# Patient Record
Sex: FEMALE | Race: WHITE | NOT HISPANIC OR LATINO | Employment: FULL TIME | URBAN - METROPOLITAN AREA
[De-identification: names, ages, dates, MRNs, and addresses within clinical notes are randomized per-mention and may not be internally consistent; named-entity substitution may affect disease eponyms.]

---

## 2019-04-12 ENCOUNTER — TRANSCRIBE ORDERS (OUTPATIENT)
Dept: LAB | Facility: HOSPITAL | Age: 29
End: 2019-04-12

## 2019-04-12 ENCOUNTER — LAB (OUTPATIENT)
Dept: LAB | Facility: HOSPITAL | Age: 29
End: 2019-04-12

## 2019-04-12 ENCOUNTER — APPOINTMENT (OUTPATIENT)
Dept: LAB | Facility: HOSPITAL | Age: 29
End: 2019-04-12

## 2019-04-12 DIAGNOSIS — Z32.01 PREGNANCY EXAMINATION OR TEST, POSITIVE RESULT: ICD-10-CM

## 2019-04-12 DIAGNOSIS — Z32.01 PREGNANCY EXAMINATION OR TEST, POSITIVE RESULT: Primary | ICD-10-CM

## 2019-04-12 LAB — HCG INTACT+B SERPL-ACNC: 4180 MIU/ML

## 2019-04-12 PROCEDURE — 36415 COLL VENOUS BLD VENIPUNCTURE: CPT

## 2019-04-12 PROCEDURE — 84702 CHORIONIC GONADOTROPIN TEST: CPT

## 2019-05-07 ENCOUNTER — LAB (OUTPATIENT)
Dept: LAB | Facility: HOSPITAL | Age: 29
End: 2019-05-07

## 2019-05-07 ENCOUNTER — TRANSCRIBE ORDERS (OUTPATIENT)
Dept: LAB | Facility: HOSPITAL | Age: 29
End: 2019-05-07

## 2019-05-07 DIAGNOSIS — Z34.81 PRENATAL CARE, SUBSEQUENT PREGNANCY, FIRST TRIMESTER: ICD-10-CM

## 2019-05-07 DIAGNOSIS — Z3A.08 8 WEEKS GESTATION OF PREGNANCY: Primary | ICD-10-CM

## 2019-05-07 DIAGNOSIS — Z3A.08 8 WEEKS GESTATION OF PREGNANCY: ICD-10-CM

## 2019-05-07 LAB
ABO GROUP BLD: NORMAL
BASOPHILS # BLD AUTO: 0.03 10*3/MM3 (ref 0–0.2)
BASOPHILS NFR BLD AUTO: 0.3 % (ref 0–1.5)
BLD GP AB SCN SERPL QL: NEGATIVE
DEPRECATED RDW RBC AUTO: 40.3 FL (ref 37–54)
EOSINOPHIL # BLD AUTO: 0.05 10*3/MM3 (ref 0–0.4)
EOSINOPHIL NFR BLD AUTO: 0.5 % (ref 0.3–6.2)
ERYTHROCYTE [DISTWIDTH] IN BLOOD BY AUTOMATED COUNT: 12.7 % (ref 12.3–15.4)
GLUCOSE BLD-MCNC: 93 MG/DL (ref 65–99)
HBV SURFACE AG SERPL QL IA: NORMAL
HCT VFR BLD AUTO: 41.3 % (ref 34–46.6)
HCV AB SER DONR QL: NORMAL
HGB BLD-MCNC: 13.1 G/DL (ref 12–15.9)
HIV1+2 AB SER QL: NORMAL
IMM GRANULOCYTES # BLD AUTO: 0.05 10*3/MM3 (ref 0–0.05)
IMM GRANULOCYTES NFR BLD AUTO: 0.5 % (ref 0–0.5)
LYMPHOCYTES # BLD AUTO: 1.67 10*3/MM3 (ref 0.7–3.1)
LYMPHOCYTES NFR BLD AUTO: 16.3 % (ref 19.6–45.3)
MCH RBC QN AUTO: 27.8 PG (ref 26.6–33)
MCHC RBC AUTO-ENTMCNC: 31.7 G/DL (ref 31.5–35.7)
MCV RBC AUTO: 87.5 FL (ref 79–97)
MONOCYTES # BLD AUTO: 0.74 10*3/MM3 (ref 0.1–0.9)
MONOCYTES NFR BLD AUTO: 7.2 % (ref 5–12)
NEUTROPHILS # BLD AUTO: 7.7 10*3/MM3 (ref 1.7–7)
NEUTROPHILS NFR BLD AUTO: 75.2 % (ref 42.7–76)
NRBC BLD AUTO-RTO: 0 /100 WBC (ref 0–0.2)
PLATELET # BLD AUTO: 240 10*3/MM3 (ref 140–450)
PMV BLD AUTO: 12 FL (ref 6–12)
RBC # BLD AUTO: 4.72 10*6/MM3 (ref 3.77–5.28)
RH BLD: NEGATIVE
RPR SER QL: NORMAL
RUBV IGG SERPL IA-ACNC: POSITIVE
WBC NRBC COR # BLD: 10.24 10*3/MM3 (ref 3.4–10.8)

## 2019-05-07 PROCEDURE — 36415 COLL VENOUS BLD VENIPUNCTURE: CPT

## 2019-05-07 PROCEDURE — 82947 ASSAY GLUCOSE BLOOD QUANT: CPT

## 2019-05-07 PROCEDURE — 80081 OBSTETRIC PANEL INC HIV TSTG: CPT

## 2019-05-07 PROCEDURE — 86803 HEPATITIS C AB TEST: CPT

## 2019-07-30 ENCOUNTER — LAB REQUISITION (OUTPATIENT)
Dept: LAB | Facility: HOSPITAL | Age: 29
End: 2019-07-30

## 2019-07-30 DIAGNOSIS — Z00.00 ROUTINE GENERAL MEDICAL EXAMINATION AT A HEALTH CARE FACILITY: ICD-10-CM

## 2019-07-30 PROCEDURE — 36415 COLL VENOUS BLD VENIPUNCTURE: CPT

## 2019-09-24 ENCOUNTER — APPOINTMENT (OUTPATIENT)
Dept: LAB | Facility: HOSPITAL | Age: 29
End: 2019-09-24

## 2019-09-24 ENCOUNTER — TRANSCRIBE ORDERS (OUTPATIENT)
Dept: LAB | Facility: HOSPITAL | Age: 29
End: 2019-09-24

## 2019-09-24 DIAGNOSIS — Z3A.28 28 WEEKS GESTATION OF PREGNANCY: ICD-10-CM

## 2019-09-24 DIAGNOSIS — Z34.83 PRENATAL CARE, SUBSEQUENT PREGNANCY, THIRD TRIMESTER: Primary | ICD-10-CM

## 2019-09-24 LAB
BLD GP AB SCN SERPL QL: NEGATIVE
DEPRECATED RDW RBC AUTO: 38.5 FL (ref 37–54)
ERYTHROCYTE [DISTWIDTH] IN BLOOD BY AUTOMATED COUNT: 12.4 % (ref 12.3–15.4)
GLUCOSE 1H P 100 G GLC PO SERPL-MCNC: 127 MG/DL (ref 65–140)
HCT VFR BLD AUTO: 35.2 % (ref 34–46.6)
HGB BLD-MCNC: 11.5 G/DL (ref 12–15.9)
MCH RBC QN AUTO: 27.8 PG (ref 26.6–33)
MCHC RBC AUTO-ENTMCNC: 32.7 G/DL (ref 31.5–35.7)
MCV RBC AUTO: 85 FL (ref 79–97)
PLATELET # BLD AUTO: 242 10*3/MM3 (ref 140–450)
PMV BLD AUTO: 10.9 FL (ref 6–12)
RBC # BLD AUTO: 4.14 10*6/MM3 (ref 3.77–5.28)
WBC NRBC COR # BLD: 13.52 10*3/MM3 (ref 3.4–10.8)

## 2019-09-24 PROCEDURE — 85027 COMPLETE CBC AUTOMATED: CPT | Performed by: OBSTETRICS & GYNECOLOGY

## 2019-09-24 PROCEDURE — 36415 COLL VENOUS BLD VENIPUNCTURE: CPT | Performed by: OBSTETRICS & GYNECOLOGY

## 2019-09-24 PROCEDURE — 86850 RBC ANTIBODY SCREEN: CPT | Performed by: OBSTETRICS & GYNECOLOGY

## 2019-09-24 PROCEDURE — 82950 GLUCOSE TEST: CPT | Performed by: OBSTETRICS & GYNECOLOGY

## 2019-12-03 ENCOUNTER — HOSPITAL ENCOUNTER (OUTPATIENT)
Facility: HOSPITAL | Age: 29
Discharge: HOME | End: 2019-12-03
Attending: OBSTETRICS & GYNECOLOGY | Admitting: ADVANCED PRACTICE MIDWIFE

## 2019-12-03 VITALS
BODY MASS INDEX: 48.4 KG/M2 | HEIGHT: 62 IN | DIASTOLIC BLOOD PRESSURE: 72 MMHG | TEMPERATURE: 98.9 F | SYSTOLIC BLOOD PRESSURE: 118 MMHG | WEIGHT: 263 LBS | RESPIRATION RATE: 18 BRPM | HEART RATE: 78 BPM

## 2019-12-03 PROBLEM — Z3A.38 38 WEEKS GESTATION OF PREGNANCY: Status: ACTIVE | Noted: 2019-12-03

## 2019-12-03 LAB
ALP SERPL-CCNC: 142 U/L (ref 39–117)
ALT SERPL W P-5'-P-CCNC: 10 U/L (ref 1–33)
AST SERPL-CCNC: 12 U/L (ref 1–32)
BILIRUB SERPL-MCNC: <0.2 MG/DL (ref 0.2–1.2)
CREAT BLD-MCNC: 0.62 MG/DL (ref 0.57–1)
DEPRECATED RDW RBC AUTO: 40.6 FL (ref 37–54)
ERYTHROCYTE [DISTWIDTH] IN BLOOD BY AUTOMATED COUNT: 13.4 % (ref 12.3–15.4)
HCT VFR BLD AUTO: 35.1 % (ref 34–46.6)
HGB BLD-MCNC: 11.1 G/DL (ref 12–15.9)
LDH SERPL-CCNC: 144 U/L (ref 135–214)
MCH RBC QN AUTO: 26.4 PG (ref 26.6–33)
MCHC RBC AUTO-ENTMCNC: 31.6 G/DL (ref 31.5–35.7)
MCV RBC AUTO: 83.6 FL (ref 79–97)
PLATELET # BLD AUTO: 228 10*3/MM3 (ref 140–450)
PMV BLD AUTO: 11.4 FL (ref 6–12)
RBC # BLD AUTO: 4.2 10*6/MM3 (ref 3.77–5.28)
URATE SERPL-MCNC: 4 MG/DL (ref 2.4–5.7)
WBC NRBC COR # BLD: 16.45 10*3/MM3 (ref 3.4–10.8)

## 2019-12-03 PROCEDURE — 82565 ASSAY OF CREATININE: CPT | Performed by: ADVANCED PRACTICE MIDWIFE

## 2019-12-03 PROCEDURE — 85027 COMPLETE CBC AUTOMATED: CPT | Performed by: ADVANCED PRACTICE MIDWIFE

## 2019-12-03 PROCEDURE — 84460 ALANINE AMINO (ALT) (SGPT): CPT | Performed by: ADVANCED PRACTICE MIDWIFE

## 2019-12-03 PROCEDURE — G0463 HOSPITAL OUTPT CLINIC VISIT: HCPCS

## 2019-12-03 PROCEDURE — 59025 FETAL NON-STRESS TEST: CPT

## 2019-12-03 PROCEDURE — 84450 TRANSFERASE (AST) (SGOT): CPT | Performed by: ADVANCED PRACTICE MIDWIFE

## 2019-12-03 PROCEDURE — 83615 LACTATE (LD) (LDH) ENZYME: CPT | Performed by: ADVANCED PRACTICE MIDWIFE

## 2019-12-03 PROCEDURE — 82247 BILIRUBIN TOTAL: CPT | Performed by: ADVANCED PRACTICE MIDWIFE

## 2019-12-03 PROCEDURE — 84550 ASSAY OF BLOOD/URIC ACID: CPT | Performed by: ADVANCED PRACTICE MIDWIFE

## 2019-12-03 PROCEDURE — 84075 ASSAY ALKALINE PHOSPHATASE: CPT | Performed by: ADVANCED PRACTICE MIDWIFE

## 2019-12-03 PROCEDURE — 36415 COLL VENOUS BLD VENIPUNCTURE: CPT | Performed by: ADVANCED PRACTICE MIDWIFE

## 2019-12-03 RX ORDER — PRENATAL WITH FERROUS FUM AND FOLIC ACID 3080; 920; 120; 400; 22; 1.84; 3; 20; 10; 1; 12; 200; 27; 25; 2 [IU]/1; [IU]/1; MG/1; [IU]/1; MG/1; MG/1; MG/1; MG/1; MG/1; MG/1; UG/1; MG/1; MG/1; MG/1; MG/1
1 TABLET ORAL DAILY
COMMUNITY
End: 2019-12-14 | Stop reason: HOSPADM

## 2019-12-04 ENCOUNTER — HOSPITAL ENCOUNTER (OUTPATIENT)
Facility: HOSPITAL | Age: 29
Discharge: HOME | End: 2019-12-04
Attending: OBSTETRICS & GYNECOLOGY | Admitting: OBSTETRICS & GYNECOLOGY

## 2019-12-04 VITALS
DIASTOLIC BLOOD PRESSURE: 69 MMHG | SYSTOLIC BLOOD PRESSURE: 128 MMHG | RESPIRATION RATE: 16 BRPM | TEMPERATURE: 97.9 F | HEART RATE: 70 BPM

## 2019-12-04 PROCEDURE — G0463 HOSPITAL OUTPT CLINIC VISIT: HCPCS

## 2019-12-04 PROCEDURE — 99203 OFFICE O/P NEW LOW 30 MIN: CPT | Performed by: OBSTETRICS & GYNECOLOGY

## 2019-12-04 PROCEDURE — 59025 FETAL NON-STRESS TEST: CPT

## 2019-12-04 RX ORDER — MULTIVITAMIN WITH IRON
TABLET ORAL
COMMUNITY
End: 2019-12-14 | Stop reason: HOSPADM

## 2019-12-04 NOTE — H&P
[facility]  Obstetric History and Physical    Chief Complaint   Patient presents with   • Elevated Blood Pressure       Subjective     Patient is a 28 y.o. female  currently at 38w2d, who presents with complaints of headache and elevated blood pressure.  She was seen last evening for similar complaints and noted to be normotensive on evaluation on labor and delivery.  Laboratory evaluation showed no evidence of HELLP.  She states she was at work today and had her blood pressure repeated.  Again she reported elevated values and presents for evaluation.  On presentation, q. 10-minute vitals of been obtained all with normal pressures.    Her prenatal care is notable for recent onset of transient gestational hypertension.. Her previous obstetric/gynecological history is noncontributory.    The following portions of the patients history were reviewed and updated as appropriate: current medications, allergies, past medical history, past surgical history, past family history, past social history and problem list .        Prenatal Information:   Maternal Prenatal Labs  Blood Type No results found for: ABO   Rh Status No results found for: RH   Antibody Screen No results found for: ABSCRN   Gonnorhea No results found for: GCCX   Chlamydia No results found for: CLAMYDCU   RPR No results found for: RPR   Syphilis Antibody No results found for: SYPHILIS   Rubella No results found for: RUBELLAIGGIN   Hepatitis B Surface Antigen No results found for: HEPBSAG   HIV-1 Antibody No results found for: LABHIV1   Hepatitis C Antibody No results found for: HEPCAB   Rapid Urin Drug Screen No results found for: AMPMETHU, BARBITSCNUR, LABBENZSCN, LABMETHSCN, LABOPIASCN, THCURSCR, COCAINEUR, AMPHETSCREEN, PROPOXSCN, BUPRENORSCNU, METAMPSCNUR, OXYCODONESCN, TRICYCLICSCN   Group B Strep Culture No results found for: GBSANTIGEN           External Prenatal Results     Pregnancy Outside Results - Transcribed From Office Records - See  Scanned Records For Details     Test Value Date Time    Hgb 11.1 g/dL 19 182    Hct 35.1 % 19 182    ABO AB  19    Rh Negative  19    Antibody Screen Negative  19 0958    Glucose Fasting GTT       Glucose Tolerance Test 1 hour       Glucose Tolerance Test 3 hour       Gonorrhea (discrete)       Chlamydia (discrete)       RPR Non-Reactive  19    VDRL       Syphilis Antibody       Rubella Positive  19    HBsAg Non-Reactive  19    Herpes Simplex Virus PCR       Herpes Simplex VIrus Culture       HIV Non-Reactive  19    Hep C RNA Quant PCR       Hep C Antibody Non-Reactive  19    AFP       Group B Strep       GBS Susceptibility to Clindamycin       GBS Susceptibility to Erythromycin       Fetal Fibronectin       Genetic Testing, Maternal Blood             Drug Screening     Test Value Date Time    Urine Drug Screen       Amphetamine Screen       Barbiturate Screen       Benzodiazepine Screen       Methadone Screen       Phencyclidine Screen       Opiates Screen       THC Screen       Cocaine Screen       Propoxyphene Screen       Buprenorphine Screen       Methamphetamine Screen       Oxycodone Screen       Tricyclic Antidepressants Screen                     Past OB History:       Obstetric History       T0      L0     SAB0   TAB0   Ectopic0   Molar0   Multiple0   Live Births0       # Outcome Date GA Lbr Binu/2nd Weight Sex Delivery Anes PTL Lv   1 Current                   Past Medical History:  Past Medical History:   Diagnosis Date   • Gestational hypertension    • Migraine    • Osteoarthritis     right foot   • Urinary tract infection     frequent as a child      Past Surgical History Past Surgical History:   Procedure Laterality Date   • MOUTH SURGERY     • TONSILLECTOMY     • WISDOM TOOTH EXTRACTION        Family History: Family History   Problem Relation Age of Onset   • No Known Problems Father    • No  Known Problems Mother    • No Known Problems Brother    • No Known Problems Sister    • No Known Problems Son    • No Known Problems Daughter    • No Known Problems Paternal Grandfather    • No Known Problems Paternal Grandmother    • No Known Problems Maternal Grandmother    • No Known Problems Maternal Grandfather    • No Known Problems Maternal Aunt    • No Known Problems Maternal Uncle    • No Known Problems Paternal Aunt    • No Known Problems Paternal Uncle    • No Known Problems Other       Social History:  reports that she has never smoked. She has never used smokeless tobacco.   reports that she does not drink alcohol.   reports that she does not use drugs.   Allergies: Patient has no known allergies.  Current Medications:          No current facility-administered medications on file prior to encounter.      Current Outpatient Medications on File Prior to Encounter   Medication Sig Dispense Refill   • Magnesium 250 MG tablet Take  by mouth.     • Prenatal Vit-Fe Fumarate-FA (PRENATAL 27-1) 27-1 MG tablet tablet Take 1 tablet by mouth Daily.         General ROS: Pertinent items are noted in HPI, all other systems reviewed and negative    Objective       Vital Signs Range for the last 24 hours  Temperature: Temp:  [97.9 °F (36.6 °C)-98.9 °F (37.2 °C)] 97.9 °F (36.6 °C)   Temp Source: Temp src: Oral   BP: BP: (118-128)/(59-72) 128/69   Pulse: Heart Rate:  [69-78] 70   Respirations: Resp:  [16-18] 16   SPO2:     O2 Amount (l/min):     O2 Devices     Weight: Weight:  [119 kg (263 lb)] 119 kg (263 lb)     Physical Examination: General appearance - alert, well appearing, and in no distress, oriented to person, place, and time and overweight  Mental status - alert, oriented to person, place, and time, anxious  Eyes - pupils equal and reactive, extraocular eye movements intact, sclera anicteric  Mouth - mucous membranes moist, pharynx normal without lesions and dental hygiene good  Neck - supple, no significant  adenopathy, thyroid exam: thyroid is normal in size without nodules or tenderness  Chest - clear to auscultation, no wheezes, rales or rhonchi, symmetric air entry  Heart - normal rate, regular rhythm, normal S1, S2, no murmurs, rubs, clicks or gallops  Abdomen-soft, nontender, nondistended, no masses or organomegaly   Abdomen, Non-Tender  Neurological - alert, oriented, normal speech, no focal findings or movement disorder noted, DTR's normal and symmetric  Extremities - pedal edema 1 +    Fetal Heart Rate Assessment  Indication: Transient hypertension   Start Time: 1335                end Time: 1449   NST Results: NST reactive.  Baseline fetal heart rate 125-130 bpm.  Average variability with multiple 15 x 15 accelerations.  No decelerations.  No contractions.        Laboratory Results:   Lab Results   Component Value Date    ALKPHOS 142 (H) 2019    ALT 10 2019    AST 12 2019    CREATININE 0.62 2019    BILITOT <0.2 (L) 2019     2019    URICACID 4.0 2019       WBC   Date Value Ref Range Status   2019 16.45 (H) 3.40 - 10.80 10*3/mm3 Final     RBC   Date Value Ref Range Status   2019 4.20 3.77 - 5.28 10*6/mm3 Final     Hemoglobin   Date Value Ref Range Status   2019 11.1 (L) 12.0 - 15.9 g/dL Final     Hematocrit   Date Value Ref Range Status   2019 35.1 34.0 - 46.6 % Final     MCV   Date Value Ref Range Status   2019 83.6 79.0 - 97.0 fL Final     MCH   Date Value Ref Range Status   2019 26.4 (L) 26.6 - 33.0 pg Final     MCHC   Date Value Ref Range Status   2019 31.6 31.5 - 35.7 g/dL Final     RDW   Date Value Ref Range Status   2019 13.4 12.3 - 15.4 % Final     RDW-SD   Date Value Ref Range Status   2019 40.6 37.0 - 54.0 fl Final     MPV   Date Value Ref Range Status   2019 11.4 6.0 - 12.0 fL Final     Platelets   Date Value Ref Range Status   2019 228 140 - 450 10*3/mm3 Final             Brief  "Urine Lab Results     None            Radiology Review: No new studies  Other Studies: See CBC and PEP from last evening    Assessment/Plan       * No active hospital problems. *        Assessment:  1. Transient gestational hypertension.  Normotensive on evaluation today    Plan:  1. Discharge to home.  2. Instructed regarding use of appropriate BP cuff.  3. Signs/symptoms of severe preeclampsia reviewed.  4. Keep appointment for induction of labor scheduled for 12/10.     Total time spent today with Valentine  was 20 minutes (level 3).  Of this time, > 50% was spent face-to-face time coordinating care, answering her questions and counseling regarding pathophysiology of her presenting problem along with plans for any diagnostic work-up and treatment.        Tristin Maurice \"Cheyanne\" PRADEEP Forte MD  12/4/2019  3:02 PM    "

## 2019-12-04 NOTE — DISCHARGE SUMMARY
[facility]  Observation and Discharge summary      Patient: Valentine Gonzales      MR#:[id number]  Admission  Diagnosis:   Patient Active Problem List   Diagnosis   • 38 weeks gestation of pregnancy     Discharge Diagnosis: 38 weeks gestation of pregnany      Date of Admission: 12/3/2019  Date of Discharge:  12/3/2019      Service:  Obstetrics    Hospital Course:  Patient presented from office with borderline blood pressures of 140/80s and HA last evening.  She has had normal blood pressures to this point. Blood pressures here under observation are 118-127/59-72. She states HA has resolved spontaneously. She denies visual disturbances and epigastric pain. Reflexes 2+, no clonus. Labs WNL. She voices good fetal movement. She denies vaginal bleeding, leaking of fluid, and contractions. She is scheduled for IOL next week. During hospitaliztiton, she remained afebrile and hemodynamically stable.  On the day of discharge, she was eating, ambulating and voiding without difficulty.      Labs:    Lab Results   Component Value Date    WBC 16.45 (H) 12/03/2019    HGB 11.1 (L) 12/03/2019    HCT 35.1 12/03/2019    MCV 83.6 12/03/2019     12/03/2019    URICACID 4.0 12/03/2019    AST 12 12/03/2019    ALT 10 12/03/2019     12/03/2019           Discharge Medications     Discharge Medications      ASK your doctor about these medications      Instructions Start Date   Prenatal 27-1 27-1 MG tablet tablet   1 tablet, Oral, Daily             Discharge Disposition:  To Home    Discharge Condition:  Stable    Discharge Diet: Regular    Activity at Discharge: Ad tej. S/S of preeclampsia reviewed.     Follow-up Appointments  Follow up with [facility] as scheduled.     Delfino Mendez CNM  12/03/19  10:26 PM

## 2019-12-04 NOTE — NURSING NOTE
Patient discharged ambulatory with spouse home @1943. Discharge instructions reviewed and written materials given to patient. Patient questions answered, and no other patient needs identified.

## 2019-12-10 ENCOUNTER — HOSPITAL ENCOUNTER (OUTPATIENT)
Dept: LABOR AND DELIVERY | Facility: HOSPITAL | Age: 29
Setting detail: SURGERY ADMIT
Discharge: HOME | End: 2019-12-10

## 2019-12-10 ENCOUNTER — HOSPITAL ENCOUNTER (INPATIENT)
Facility: HOSPITAL | Age: 29
LOS: 4 days | Discharge: HOME | End: 2019-12-14
Attending: OBSTETRICS & GYNECOLOGY | Admitting: OBSTETRICS & GYNECOLOGY

## 2019-12-10 PROBLEM — Z34.90 TERM PREGNANCY: Status: ACTIVE | Noted: 2019-12-10

## 2019-12-10 LAB
ABO GROUP BLD: NORMAL
ALP SERPL-CCNC: 151 U/L (ref 39–117)
ALT SERPL W P-5'-P-CCNC: 5 U/L (ref 1–33)
AST SERPL-CCNC: 17 U/L (ref 1–32)
BILIRUB SERPL-MCNC: <0.2 MG/DL (ref 0.2–1.2)
BLD GP AB SCN SERPL QL: NEGATIVE
CREAT BLD-MCNC: 0.63 MG/DL (ref 0.57–1)
DEPRECATED RDW RBC AUTO: 41.5 FL (ref 37–54)
ERYTHROCYTE [DISTWIDTH] IN BLOOD BY AUTOMATED COUNT: 13.5 % (ref 12.3–15.4)
HCT VFR BLD AUTO: 33.2 % (ref 34–46.6)
HGB BLD-MCNC: 10.8 G/DL (ref 12–15.9)
LDH SERPL-CCNC: 241 U/L (ref 135–214)
MCH RBC QN AUTO: 27.3 PG (ref 26.6–33)
MCHC RBC AUTO-ENTMCNC: 32.5 G/DL (ref 31.5–35.7)
MCV RBC AUTO: 83.8 FL (ref 79–97)
PLATELET # BLD AUTO: 215 10*3/MM3 (ref 140–450)
PMV BLD AUTO: 11.5 FL (ref 6–12)
RBC # BLD AUTO: 3.96 10*6/MM3 (ref 3.77–5.28)
RH BLD: NEGATIVE
T&S EXPIRATION DATE: NORMAL
URATE SERPL-MCNC: 4.2 MG/DL (ref 2.4–5.7)
WBC NRBC COR # BLD: 13.63 10*3/MM3 (ref 3.4–10.8)

## 2019-12-10 PROCEDURE — 82565 ASSAY OF CREATININE: CPT | Performed by: OBSTETRICS & GYNECOLOGY

## 2019-12-10 PROCEDURE — 84460 ALANINE AMINO (ALT) (SGPT): CPT | Performed by: OBSTETRICS & GYNECOLOGY

## 2019-12-10 PROCEDURE — 86900 BLOOD TYPING SEROLOGIC ABO: CPT | Performed by: OBSTETRICS & GYNECOLOGY

## 2019-12-10 PROCEDURE — 84075 ASSAY ALKALINE PHOSPHATASE: CPT | Performed by: OBSTETRICS & GYNECOLOGY

## 2019-12-10 PROCEDURE — 59200 INSERT CERVICAL DILATOR: CPT | Performed by: OBSTETRICS & GYNECOLOGY

## 2019-12-10 PROCEDURE — 85027 COMPLETE CBC AUTOMATED: CPT | Performed by: OBSTETRICS & GYNECOLOGY

## 2019-12-10 PROCEDURE — 86901 BLOOD TYPING SEROLOGIC RH(D): CPT | Performed by: OBSTETRICS & GYNECOLOGY

## 2019-12-10 PROCEDURE — 83615 LACTATE (LD) (LDH) ENZYME: CPT | Performed by: OBSTETRICS & GYNECOLOGY

## 2019-12-10 PROCEDURE — 86850 RBC ANTIBODY SCREEN: CPT | Performed by: OBSTETRICS & GYNECOLOGY

## 2019-12-10 PROCEDURE — 84550 ASSAY OF BLOOD/URIC ACID: CPT | Performed by: OBSTETRICS & GYNECOLOGY

## 2019-12-10 PROCEDURE — 82247 BILIRUBIN TOTAL: CPT | Performed by: OBSTETRICS & GYNECOLOGY

## 2019-12-10 PROCEDURE — 84450 TRANSFERASE (AST) (SGOT): CPT | Performed by: OBSTETRICS & GYNECOLOGY

## 2019-12-10 RX ORDER — SODIUM CHLORIDE, SODIUM LACTATE, POTASSIUM CHLORIDE, CALCIUM CHLORIDE 600; 310; 30; 20 MG/100ML; MG/100ML; MG/100ML; MG/100ML
125 INJECTION, SOLUTION INTRAVENOUS CONTINUOUS
Status: DISCONTINUED | OUTPATIENT
Start: 2019-12-10 | End: 2019-12-12

## 2019-12-10 RX ORDER — BUTORPHANOL TARTRATE 1 MG/ML
1 INJECTION, SOLUTION INTRAMUSCULAR; INTRAVENOUS
Status: DISCONTINUED | OUTPATIENT
Start: 2019-12-10 | End: 2019-12-12

## 2019-12-10 RX ORDER — MAGNESIUM CARB/ALUMINUM HYDROX 105-160MG
30 TABLET,CHEWABLE ORAL ONCE
Status: DISCONTINUED | OUTPATIENT
Start: 2019-12-10 | End: 2019-12-12

## 2019-12-10 RX ORDER — ONDANSETRON 2 MG/ML
4 INJECTION INTRAMUSCULAR; INTRAVENOUS EVERY 6 HOURS PRN
Status: DISCONTINUED | OUTPATIENT
Start: 2019-12-10 | End: 2019-12-12

## 2019-12-10 RX ORDER — LIDOCAINE HYDROCHLORIDE 10 MG/ML
5 INJECTION, SOLUTION EPIDURAL; INFILTRATION; INTRACAUDAL; PERINEURAL AS NEEDED
Status: DISCONTINUED | OUTPATIENT
Start: 2019-12-10 | End: 2019-12-12

## 2019-12-10 RX ORDER — OXYTOCIN-SODIUM CHLORIDE 0.9% IV SOLN 30 UNIT/500ML 30-0.9/5 UT/ML-%
2-24 SOLUTION INTRAVENOUS
Status: DISCONTINUED | OUTPATIENT
Start: 2019-12-11 | End: 2019-12-12

## 2019-12-10 RX ORDER — ACETAMINOPHEN 325 MG/1
650 TABLET ORAL EVERY 4 HOURS PRN
Status: DISCONTINUED | OUTPATIENT
Start: 2019-12-10 | End: 2019-12-12

## 2019-12-10 RX ORDER — TERBUTALINE SULFATE 1 MG/ML
0.25 INJECTION, SOLUTION SUBCUTANEOUS AS NEEDED
Status: DISCONTINUED | OUTPATIENT
Start: 2019-12-10 | End: 2019-12-12

## 2019-12-10 RX ORDER — SODIUM CHLORIDE 0.9 % (FLUSH) 0.9 %
3 SYRINGE (ML) INJECTION EVERY 12 HOURS SCHEDULED
Status: DISCONTINUED | OUTPATIENT
Start: 2019-12-10 | End: 2019-12-12

## 2019-12-10 RX ORDER — ONDANSETRON 4 MG/1
4 TABLET, FILM COATED ORAL EVERY 6 HOURS PRN
Status: DISCONTINUED | OUTPATIENT
Start: 2019-12-10 | End: 2019-12-12

## 2019-12-10 RX ORDER — SODIUM CHLORIDE 0.9 % (FLUSH) 0.9 %
1-10 SYRINGE (ML) INJECTION AS NEEDED
Status: DISCONTINUED | OUTPATIENT
Start: 2019-12-10 | End: 2019-12-12

## 2019-12-10 RX ADMIN — SODIUM CHLORIDE, POTASSIUM CHLORIDE, SODIUM LACTATE AND CALCIUM CHLORIDE 125 ML/HR: 600; 310; 30; 20 INJECTION, SOLUTION INTRAVENOUS at 22:05

## 2019-12-11 ENCOUNTER — ANESTHESIA EVENT (OUTPATIENT)
Dept: LABOR AND DELIVERY | Facility: HOSPITAL | Age: 29
End: 2019-12-11

## 2019-12-11 ENCOUNTER — ANESTHESIA (OUTPATIENT)
Dept: LABOR AND DELIVERY | Facility: HOSPITAL | Age: 29
End: 2019-12-11

## 2019-12-11 PROBLEM — Z34.90 TERM PREGNANCY: Status: RESOLVED | Noted: 2019-12-10 | Resolved: 2019-12-11

## 2019-12-11 PROBLEM — Z98.891 S/P CESAREAN SECTION: Status: ACTIVE | Noted: 2019-12-11

## 2019-12-11 PROCEDURE — 51702 INSERT TEMP BLADDER CATH: CPT

## 2019-12-11 PROCEDURE — 25010000002 ROPIVACAINE PER 1 MG: Performed by: NURSE ANESTHETIST, CERTIFIED REGISTERED

## 2019-12-11 PROCEDURE — C1755 CATHETER, INTRASPINAL: HCPCS | Performed by: ANESTHESIOLOGY

## 2019-12-11 PROCEDURE — 25010000002 METOCLOPRAMIDE PER 10 MG: Performed by: NURSE ANESTHETIST, CERTIFIED REGISTERED

## 2019-12-11 PROCEDURE — 25010000002 FENTANYL CITRATE (PF) 100 MCG/2ML SOLUTION: Performed by: NURSE ANESTHETIST, CERTIFIED REGISTERED

## 2019-12-11 PROCEDURE — C1755 CATHETER, INTRASPINAL: HCPCS

## 2019-12-11 PROCEDURE — 25010000002 CEFAZOLIN PER 500 MG: Performed by: OBSTETRICS & GYNECOLOGY

## 2019-12-11 PROCEDURE — 25010000003 MORPHINE PER 10 MG: Performed by: ANESTHESIOLOGY

## 2019-12-11 PROCEDURE — 10907ZC DRAINAGE OF AMNIOTIC FLUID, THERAPEUTIC FROM PRODUCTS OF CONCEPTION, VIA NATURAL OR ARTIFICIAL OPENING: ICD-10-PCS | Performed by: OBSTETRICS & GYNECOLOGY

## 2019-12-11 PROCEDURE — 25010000002 BUTORPHANOL PER 1 MG: Performed by: OBSTETRICS & GYNECOLOGY

## 2019-12-11 PROCEDURE — 25010000002 FENTANYL CITRATE (PF) 100 MCG/2ML SOLUTION: Performed by: ANESTHESIOLOGY

## 2019-12-11 PROCEDURE — 3E033VJ INTRODUCTION OF OTHER HORMONE INTO PERIPHERAL VEIN, PERCUTANEOUS APPROACH: ICD-10-PCS | Performed by: OBSTETRICS & GYNECOLOGY

## 2019-12-11 PROCEDURE — 25010000002 ONDANSETRON PER 1 MG: Performed by: OBSTETRICS & GYNECOLOGY

## 2019-12-11 PROCEDURE — 59025 FETAL NON-STRESS TEST: CPT

## 2019-12-11 RX ORDER — IBUPROFEN 600 MG/1
600 TABLET ORAL EVERY 6 HOURS PRN
Status: DISCONTINUED | OUTPATIENT
Start: 2019-12-11 | End: 2019-12-12 | Stop reason: HOSPADM

## 2019-12-11 RX ORDER — CEFAZOLIN SODIUM IN 0.9 % NACL 3 G/100 ML
3 INTRAVENOUS SOLUTION, PIGGYBACK (ML) INTRAVENOUS ONCE
Status: COMPLETED | OUTPATIENT
Start: 2019-12-11 | End: 2019-12-11

## 2019-12-11 RX ORDER — FENTANYL CITRATE 50 UG/ML
INJECTION, SOLUTION INTRAMUSCULAR; INTRAVENOUS
Status: COMPLETED | OUTPATIENT
Start: 2019-12-11 | End: 2019-12-11

## 2019-12-11 RX ORDER — FENTANYL CITRATE 50 UG/ML
INJECTION, SOLUTION INTRAMUSCULAR; INTRAVENOUS AS NEEDED
Status: DISCONTINUED | OUTPATIENT
Start: 2019-12-11 | End: 2019-12-11 | Stop reason: SURG

## 2019-12-11 RX ORDER — ONDANSETRON 2 MG/ML
4 INJECTION INTRAMUSCULAR; INTRAVENOUS ONCE
Status: DISCONTINUED | OUTPATIENT
Start: 2019-12-12 | End: 2019-12-12 | Stop reason: HOSPADM

## 2019-12-11 RX ORDER — TRISODIUM CITRATE DIHYDRATE AND CITRIC ACID MONOHYDRATE 500; 334 MG/5ML; MG/5ML
30 SOLUTION ORAL ONCE
Status: COMPLETED | OUTPATIENT
Start: 2019-12-11 | End: 2019-12-11

## 2019-12-11 RX ORDER — CARBOPROST TROMETHAMINE 250 UG/ML
250 INJECTION, SOLUTION INTRAMUSCULAR AS NEEDED
Status: DISCONTINUED | OUTPATIENT
Start: 2019-12-11 | End: 2019-12-12 | Stop reason: HOSPADM

## 2019-12-11 RX ORDER — METOCLOPRAMIDE HYDROCHLORIDE 5 MG/ML
10 INJECTION INTRAMUSCULAR; INTRAVENOUS ONCE AS NEEDED
Status: COMPLETED | OUTPATIENT
Start: 2019-12-11 | End: 2019-12-11

## 2019-12-11 RX ORDER — MORPHINE SULFATE 0.5 MG/ML
INJECTION, SOLUTION EPIDURAL; INTRATHECAL; INTRAVENOUS
Status: COMPLETED | OUTPATIENT
Start: 2019-12-11 | End: 2019-12-11

## 2019-12-11 RX ORDER — BUPIVACAINE HYDROCHLORIDE 7.5 MG/ML
INJECTION, SOLUTION INTRASPINAL
Status: COMPLETED | OUTPATIENT
Start: 2019-12-11 | End: 2019-12-11

## 2019-12-11 RX ORDER — METHYLERGONOVINE MALEATE 0.2 MG/ML
200 INJECTION INTRAVENOUS ONCE AS NEEDED
Status: DISCONTINUED | OUTPATIENT
Start: 2019-12-11 | End: 2019-12-12 | Stop reason: HOSPADM

## 2019-12-11 RX ORDER — OXYTOCIN-SODIUM CHLORIDE 0.9% IV SOLN 30 UNIT/500ML 30-0.9/5 UT/ML-%
85 SOLUTION INTRAVENOUS ONCE
Status: COMPLETED | OUTPATIENT
Start: 2019-12-12 | End: 2019-12-11

## 2019-12-11 RX ORDER — EPHEDRINE SULFATE/0.9% NACL/PF 25 MG/5 ML
10 SYRINGE (ML) INTRAVENOUS
Status: DISCONTINUED | OUTPATIENT
Start: 2019-12-11 | End: 2019-12-12

## 2019-12-11 RX ORDER — LIDOCAINE HYDROCHLORIDE AND EPINEPHRINE 15; 5 MG/ML; UG/ML
INJECTION, SOLUTION EPIDURAL AS NEEDED
Status: DISCONTINUED | OUTPATIENT
Start: 2019-12-11 | End: 2019-12-11 | Stop reason: SURG

## 2019-12-11 RX ORDER — FAMOTIDINE 10 MG/ML
20 INJECTION, SOLUTION INTRAVENOUS ONCE AS NEEDED
Status: COMPLETED | OUTPATIENT
Start: 2019-12-11 | End: 2019-12-11

## 2019-12-11 RX ORDER — MISOPROSTOL 200 UG/1
800 TABLET ORAL AS NEEDED
Status: DISCONTINUED | OUTPATIENT
Start: 2019-12-11 | End: 2019-12-12 | Stop reason: HOSPADM

## 2019-12-11 RX ORDER — DIPHENHYDRAMINE HYDROCHLORIDE 50 MG/ML
12.5 INJECTION INTRAMUSCULAR; INTRAVENOUS EVERY 8 HOURS PRN
Status: DISCONTINUED | OUTPATIENT
Start: 2019-12-11 | End: 2019-12-12

## 2019-12-11 RX ORDER — LIDOCAINE HYDROCHLORIDE 20 MG/ML
INJECTION, SOLUTION INFILTRATION; PERINEURAL AS NEEDED
Status: DISCONTINUED | OUTPATIENT
Start: 2019-12-11 | End: 2019-12-11 | Stop reason: SURG

## 2019-12-11 RX ORDER — OXYTOCIN-SODIUM CHLORIDE 0.9% IV SOLN 30 UNIT/500ML 30-0.9/5 UT/ML-%
650 SOLUTION INTRAVENOUS ONCE
Status: DISCONTINUED | OUTPATIENT
Start: 2019-12-12 | End: 2019-12-12 | Stop reason: HOSPADM

## 2019-12-11 RX ORDER — ONDANSETRON 2 MG/ML
4 INJECTION INTRAMUSCULAR; INTRAVENOUS ONCE AS NEEDED
Status: DISCONTINUED | OUTPATIENT
Start: 2019-12-11 | End: 2019-12-12

## 2019-12-11 RX ORDER — HYDROCODONE BITARTRATE AND ACETAMINOPHEN 5; 325 MG/1; MG/1
1 TABLET ORAL EVERY 4 HOURS PRN
Status: DISCONTINUED | OUTPATIENT
Start: 2019-12-11 | End: 2019-12-12 | Stop reason: SDUPTHER

## 2019-12-11 RX ADMIN — SODIUM CHLORIDE, POTASSIUM CHLORIDE, SODIUM LACTATE AND CALCIUM CHLORIDE 1000 ML: 600; 310; 30; 20 INJECTION, SOLUTION INTRAVENOUS at 21:18

## 2019-12-11 RX ADMIN — BUTORPHANOL TARTRATE 2 MG: 2 INJECTION, SOLUTION INTRAMUSCULAR; INTRAVENOUS at 05:25

## 2019-12-11 RX ADMIN — BUTORPHANOL TARTRATE 2 MG: 2 INJECTION, SOLUTION INTRAMUSCULAR; INTRAVENOUS at 00:55

## 2019-12-11 RX ADMIN — ONDANSETRON 4 MG: 2 INJECTION INTRAMUSCULAR; INTRAVENOUS at 21:53

## 2019-12-11 RX ADMIN — SODIUM CHLORIDE, POTASSIUM CHLORIDE, SODIUM LACTATE AND CALCIUM CHLORIDE 125 ML/HR: 600; 310; 30; 20 INJECTION, SOLUTION INTRAVENOUS at 14:54

## 2019-12-11 RX ADMIN — FENTANYL CITRATE 15 MCG: 50 INJECTION, SOLUTION INTRAMUSCULAR; INTRAVENOUS at 21:44

## 2019-12-11 RX ADMIN — ROPIVACAINE HYDROCHLORIDE 10 ML: 5 INJECTION, SOLUTION EPIDURAL; INFILTRATION; PERINEURAL at 09:02

## 2019-12-11 RX ADMIN — BUPIVACAINE HYDROCHLORIDE IN DEXTROSE 1.4 ML: 7.5 INJECTION, SOLUTION SUBARACHNOID at 21:44

## 2019-12-11 RX ADMIN — Medication 13 ML/HR: at 09:04

## 2019-12-11 RX ADMIN — OXYTOCIN 85 ML/HR: 10 INJECTION, SOLUTION INTRAMUSCULAR; INTRAVENOUS at 23:00

## 2019-12-11 RX ADMIN — LIDOCAINE HYDROCHLORIDE AND EPINEPHRINE 3 ML: 15; 5 INJECTION, SOLUTION EPIDURAL at 08:56

## 2019-12-11 RX ADMIN — SODIUM CHLORIDE 3 G: 9 INJECTION, SOLUTION INTRAVENOUS at 21:18

## 2019-12-11 RX ADMIN — SODIUM CHLORIDE, POTASSIUM CHLORIDE, SODIUM LACTATE AND CALCIUM CHLORIDE 125 ML/HR: 600; 310; 30; 20 INJECTION, SOLUTION INTRAVENOUS at 03:17

## 2019-12-11 RX ADMIN — FENTANYL CITRATE 100 MCG: 50 INJECTION, SOLUTION INTRAMUSCULAR; INTRAVENOUS at 08:59

## 2019-12-11 RX ADMIN — BUTORPHANOL TARTRATE 2 MG: 2 INJECTION, SOLUTION INTRAMUSCULAR; INTRAVENOUS at 03:17

## 2019-12-11 RX ADMIN — OXYTOCIN 500 ML: 10 INJECTION, SOLUTION INTRAMUSCULAR; INTRAVENOUS at 21:59

## 2019-12-11 RX ADMIN — MORPHINE SULFATE 0.15 MG: 0.5 INJECTION, SOLUTION EPIDURAL; INTRATHECAL; INTRAVENOUS at 21:44

## 2019-12-11 RX ADMIN — SODIUM CITRATE AND CITRIC ACID MONOHYDRATE 30 ML: 500; 334 SOLUTION ORAL at 21:18

## 2019-12-11 RX ADMIN — OXYTOCIN 2 MILLI-UNITS/MIN: 10 INJECTION, SOLUTION INTRAMUSCULAR; INTRAVENOUS at 05:25

## 2019-12-11 RX ADMIN — FAMOTIDINE 20 MG: 10 INJECTION, SOLUTION INTRAVENOUS at 21:53

## 2019-12-11 RX ADMIN — METOCLOPRAMIDE 10 MG: 5 INJECTION, SOLUTION INTRAMUSCULAR; INTRAVENOUS at 21:53

## 2019-12-11 RX ADMIN — LIDOCAINE HYDROCHLORIDE 10 ML: 20 INJECTION, SOLUTION INFILTRATION; PERINEURAL at 20:30

## 2019-12-11 RX ADMIN — HYDROCODONE BITARTRATE AND ACETAMINOPHEN 1 TABLET: 5; 325 TABLET ORAL at 23:40

## 2019-12-11 NOTE — PROGRESS NOTES
12/11/19  5:10 PM  Valentine HARMONY Block    SUBJECTIVE: still comfortable w/ epidural     ASSESSMENTS:     /64   Pulse 108   Temp 98.3 °F (36.8 °C)   Resp 18   Breastfeeding Yes     Fetal Heart Rate Assessment   Method: Fetal HR Assessment Method: fetal spiral electrode   Beats/min: Fetal HR (beats/min): 140   Baseline: Fetal Heart Baseline Rate: normal range   Varibility: Fetal HR Variability: moderate (amplitude range 6 to 25 bpm)   Accels: Fetal HR Accelerations: greater than/equal to 15 bpm, lasting at least 15 seconds   Decels: Fetal HR Decelerations: absent   Tracing Category:  1     Uterine Assessment   Method: Method: IUPC (intrauterine pressure catheter)   Frequency (min): Contraction Frequency (Minutes): 1.5-3   Ctx Count in 10 min:     Duration:     Intensity: Contraction Intensity: strong by palpation   Intensity by IUPC: Contraction Intensity (mm Hg) by IUPC: 55-70   Resting Tone: Uterine Resting Tone: soft by palpation   Resting Tone by IUPC: Uterine Resting Tone (mmHg) by IUPC: 10     Presentation: vtx   Cervix: Exam by: Method: sterile exam per physician   Dilation: Cervical Dilation (cm): 5   Effacement: Cervical Effacement: 70%   Station: Fetal Station: -2             PLAN:  Continue pitocin per protocol.  May increase to 30milliunits/min  Repeat SVE in 3h as this will marcellus 12hrs of no cervical change.  Discussed proceeding w/ PCS at that point for failure to progress--she is in agreement.  GBS neg  Cat 1 FHT    Kimberly Barton MD  5:10 PM  12/11/19

## 2019-12-11 NOTE — H&P
[facility]  Obstetric History and Physical    Chief Complaint   Patient presents with   • Scheduled Induction       Subjective     Patient is a 29 y.o. female  currently at 39w2d, who presents for induction of labor  for elective  with unfavorable cervix.    Her prenatal care is complicated by morbid obesity and EIF on anatomy scan.  Cell free fetal DNA screening was low risk.  Her previous obstetric/gynecological history is noted for is non-contributory.    The following portions of the patients history were reviewed and updated as appropriate: current medications, allergies, past medical history, past surgical history, past family history, past social history and problem list .       Prenatal Information:   Maternal Prenatal Labs  Blood Type ABO Type   Date Value Ref Range Status   12/10/2019 AB  Final      Rh Status RH type   Date Value Ref Range Status   12/10/2019 Negative  Final      Antibody Screen Antibody Screen   Date Value Ref Range Status   12/10/2019 Negative  Final      Gonnorhea No results found for: GCCX   Chlamydia No results found for: CLAMYDCU   RPR No results found for: RPR   Syphilis Antibody No results found for: SYPHILIS   Rubella No results found for: RUBELLAIGGIN   Hepatitis B Surface Antigen No results found for: HEPBSAG   HIV-1 Antibody No results found for: LABHIV1   Hepatitis C Antibody No results found for: HEPCAB   Rapid Urin Drug Screen No results found for: AMPMETHU, BARBITSCNUR, LABBENZSCN, LABMETHSCN, LABOPIASCN, THCURSCR, COCAINEUR, AMPHETSCREEN, PROPOXSCN, BUPRENORSCNU, METAMPSCNUR, OXYCODONESCN, TRICYCLICSCN   Group B Strep Culture No results found for: GBSANTIGEN                 Past OB History:       OB History    Para Term  AB Living   1 0 0 0 0 0   SAB TAB Ectopic Molar Multiple Live Births   0 0 0 0 0 0      # Outcome Date GA Lbr Binu/2nd Weight Sex Delivery Anes PTL Lv   1 Current                Past Medical History: Past Medical History:   Diagnosis  Date   • Gestational hypertension    • Migraine    • Osteoarthritis     right foot   • Urinary tract infection     frequent as a child      Past Surgical History Past Surgical History:   Procedure Laterality Date   • MOUTH SURGERY     • TONSILLECTOMY     • WISDOM TOOTH EXTRACTION        Family History: Family History   Problem Relation Age of Onset   • No Known Problems Father    • No Known Problems Mother    • No Known Problems Brother    • No Known Problems Sister    • No Known Problems Son    • No Known Problems Daughter    • No Known Problems Paternal Grandfather    • No Known Problems Paternal Grandmother    • No Known Problems Maternal Grandmother    • No Known Problems Maternal Grandfather    • No Known Problems Maternal Aunt    • No Known Problems Maternal Uncle    • No Known Problems Paternal Aunt    • No Known Problems Paternal Uncle    • No Known Problems Other       Social History:  reports that she has never smoked. She has never used smokeless tobacco.   reports that she does not drink alcohol.   reports that she does not use drugs.        REVIEW OF SYSTEMS              Reports fetal movement is normal             Denies leakage of amniotic fluid.             Denies vaginal bleeding             She reports No contractions             All other systems reviewed and are negative    Objective       Vital Signs Range for the last 24 hours  Temperature: Temp:  [98 °F (36.7 °C)-98.4 °F (36.9 °C)] 98 °F (36.7 °C)   Temp Source: Temp src: Oral   BP: BP: (128-164)/(67-88) 138/73   Pulse: Heart Rate:  [67-99] 87   Respirations: Resp:  [16-18] 16   SPO2:     O2 Amount (l/min):     O2 Devices     Weight:         Presentation: vtx   Cervix: Exam by: Method: sterile exam per physician   Dilation: Cervical Dilation (cm): 5   Effacement: Cervical Effacement: 70%   Station: Fetal Station: -2      AROM w/ clear fluid.  FSE placed to better trace FHR    Fetal Heart Rate Assessment   Method: Fetal HR Assessment Method:  external   Beats/min: Fetal HR (beats/min): 135   Baseline: Fetal Heart Baseline Rate: normal range   Varibility: Fetal HR Variability: minimal (detectable, amplitude less than or equal to 5 bpm)   Accels: Fetal HR Accelerations: absent   Decels: Fetal HR Decelerations: absent   Tracing Category:  1     Uterine Assessment   Method: Method: external tocotransducer   Frequency (min): Contraction Frequency (Minutes): 1.5-4   Ctx Count in 10 min:     Duration:     Intensity: Contraction Intensity: mild by palpation   Intensity by IUPC:     Resting Tone: Uterine Resting Tone: soft by palpation   Resting Tone by IUPC:     Kelsey Units:       Constitutional:  Well developed, well nourished, no acute distress, well-groomed.   Respiratory:  Lungs are clear to auscultation bilaterally, normal breath sounds.   Cardiovascular:  Normal rate and rhythm, no murmurs.   Gastrointestinal:  Soft, gravid, nontender.  Uterus: Soft, nontender. Fundus appropriate for dates.  Neurologic:  Alert & oriented x 3,  no focal deficits noted.   Psychiatric:  Speech and behavior appropriate.   Extremities: no cyanosis, clubbing or edema, no evidence of DVT.        Labs:  Lab Results   Component Value Date    WBC 13.63 (H) 12/10/2019    HGB 10.8 (L) 12/10/2019    HCT 33.2 (L) 12/10/2019    MCV 83.8 12/10/2019     12/10/2019    URICACID 4.2 12/10/2019    AST 17 12/10/2019    ALT 5 12/10/2019     (H) 12/10/2019     Results from last 7 days   Lab Units 12/10/19  5894   ABO TYPING  AB   RH TYPING  Negative   ANTIBODY SCREEN  Negative           Assessment/Plan       Term pregnancy        Assessment:  1.  Intrauterine pregnancy at 39w2d weeks gestation with reactive fetal status.    2.   induction of labor  for elective  with unfavorable cervix  3.  Obstetrical history significant for morbid obesity and EIF on FAS.  4.  GBS status: negative    Plan:  1.Camejo bulb for cervical ripening overnight.  Now pitocin per protocol.  AROM  performed.  Epidural now.  Repeat SVE 4h or prn  2. Plan of care has been reviewed with patient and questions answered.  3.  Risks, benefits of treatment plan have been discussed.  4.  All questions have been answered.        Kimberly Barton MD  12/11/2019  9:26 AM

## 2019-12-11 NOTE — PROCEDURES
Transcervical loyola placed per physician request.  FHT's class 1.  Patient tolerated well. 16 Slovak, 60ml.    Poncho Jose MD  12/10/2019  11:31 PM

## 2019-12-11 NOTE — ANESTHESIA PREPROCEDURE EVALUATION
Anesthesia Evaluation     Patient summary reviewed and Nursing notes reviewed   NPO Solid Status: > 6 hours  NPO Liquid Status: > 6 hours           Airway   Mallampati: II  TM distance: >3 FB  Neck ROM: full  Dental      Pulmonary - negative pulmonary ROS   Cardiovascular - negative cardio ROS        Neuro/Psych  (+) headaches (Migraines),     GI/Hepatic/Renal/Endo    (+) obesity, morbid obesity,      Musculoskeletal     Abdominal    Substance History - negative use     OB/GYN    (+) Pregnant,         Other   arthritis,                      Anesthesia Plan    ASA 3     epidural       Anesthetic plan, all risks, benefits, and alternatives have been provided, discussed and informed consent has been obtained with: patient.

## 2019-12-11 NOTE — ANESTHESIA PROCEDURE NOTES
Labor Epidural      Patient reassessed immediately prior to procedure    Patient location during procedure: OB  Performed By  CRNA: Martina Velez CRNA  Preanesthetic Checklist  Completed: patient identified, surgical consent, pre-op evaluation, timeout performed, IV checked, risks and benefits discussed and monitors and equipment checked  Prep:  Pt Position:sitting  Sterile Tech:cap, gloves, mask and sterile barrier  Prep:DuraPrep  Monitoring:blood pressure monitoring  Epidural Block Procedure:  Approach:midline  Guidance:palpation technique  Location:L3-L4  Needle Type:Tuohy  Needle Gauge:17 G  Loss of Resistance Medium: saline  Loss of Resistance: 8cm  Cath Depth at skin:13 cm  Paresthesia: left and transient  Aspiration:negative  Test Dose:negative  Number of Attempts: 2  Post Assessment:  Dressing:occlusive dressing applied and secured with tape  Pt Tolerance:patient tolerated the procedure well with no apparent complications  Complications:no

## 2019-12-11 NOTE — PROGRESS NOTES
12/11/19  12:57 PM  Valentine B Block    SUBJECTIVE: comfortable w/ epidural     ASSESSMENTS:     /57   Pulse 102   Temp 98 °F (36.7 °C)   Resp 16   Breastfeeding Yes     Fetal Heart Rate Assessment   Method: Fetal HR Assessment Method: fetal spiral electrode   Beats/min: Fetal HR (beats/min): 140   Baseline: Fetal Heart Baseline Rate: normal range   Varibility: Fetal HR Variability: moderate (amplitude range 6 to 25 bpm)   Accels: Fetal HR Accelerations: greater than/equal to 15 bpm, lasting at least 15 seconds   Decels: Fetal HR Decelerations: absent   Tracing Category:  1     Uterine Assessment   Method: Method: external tocotransducer   Frequency (min): Contraction Frequency (Minutes): 2-3   Ctx Count in 10 min:     Duration:     Intensity: Contraction Intensity: moderate by palpation   Intensity by IUPC:     Resting Tone: Uterine Resting Tone: soft by palpation   Resting Tone by IUPC:       Presentation: vtx   Cervix: Exam by: Method: sterile exam per physician   Dilation: Cervical Dilation (cm): 5   Effacement: Cervical Effacement: 70%   Station: Fetal Station: -2     IUPC placed w/o incident       PLAN:  Continue pitocin per protocol.  SVE unchanged so IUPC placed to better titrate pitocin  Repeat SVE in 4h or prn  GBS neg  Cat 1 FHT    Kimberly Barton MD  12:57 PM  12/11/19

## 2019-12-12 LAB
BASOPHILS # BLD AUTO: 0.04 10*3/MM3 (ref 0–0.2)
BASOPHILS NFR BLD AUTO: 0.2 % (ref 0–1.5)
DEPRECATED RDW RBC AUTO: 42.7 FL (ref 37–54)
EOSINOPHIL # BLD AUTO: 0.01 10*3/MM3 (ref 0–0.4)
EOSINOPHIL NFR BLD AUTO: 0 % (ref 0.3–6.2)
ERYTHROCYTE [DISTWIDTH] IN BLOOD BY AUTOMATED COUNT: 13.8 % (ref 12.3–15.4)
HCT VFR BLD AUTO: 30.3 % (ref 34–46.6)
HGB BLD-MCNC: 9.4 G/DL (ref 12–15.9)
IMM GRANULOCYTES # BLD AUTO: 0.14 10*3/MM3 (ref 0–0.05)
IMM GRANULOCYTES NFR BLD AUTO: 0.7 % (ref 0–0.5)
LYMPHOCYTES # BLD AUTO: 1.89 10*3/MM3 (ref 0.7–3.1)
LYMPHOCYTES NFR BLD AUTO: 9.2 % (ref 19.6–45.3)
MCH RBC QN AUTO: 26.6 PG (ref 26.6–33)
MCHC RBC AUTO-ENTMCNC: 31 G/DL (ref 31.5–35.7)
MCV RBC AUTO: 85.8 FL (ref 79–97)
MONOCYTES # BLD AUTO: 1.32 10*3/MM3 (ref 0.1–0.9)
MONOCYTES NFR BLD AUTO: 6.4 % (ref 5–12)
NEUTROPHILS # BLD AUTO: 17.1 10*3/MM3 (ref 1.7–7)
NEUTROPHILS NFR BLD AUTO: 83.5 % (ref 42.7–76)
NRBC BLD AUTO-RTO: 0 /100 WBC (ref 0–0.2)
PLATELET # BLD AUTO: 187 10*3/MM3 (ref 140–450)
PMV BLD AUTO: 11.3 FL (ref 6–12)
RBC # BLD AUTO: 3.53 10*6/MM3 (ref 3.77–5.28)
WBC NRBC COR # BLD: 20.5 10*3/MM3 (ref 3.4–10.8)

## 2019-12-12 PROCEDURE — 85025 COMPLETE CBC W/AUTO DIFF WBC: CPT | Performed by: OBSTETRICS & GYNECOLOGY

## 2019-12-12 RX ORDER — NALOXONE HCL 0.4 MG/ML
0.4 VIAL (ML) INJECTION
Status: ACTIVE | OUTPATIENT
Start: 2019-12-12 | End: 2019-12-13

## 2019-12-12 RX ORDER — HYDROCODONE BITARTRATE AND ACETAMINOPHEN 5; 325 MG/1; MG/1
1 TABLET ORAL EVERY 4 HOURS PRN
Status: DISCONTINUED | OUTPATIENT
Start: 2019-12-12 | End: 2019-12-14 | Stop reason: HOSPADM

## 2019-12-12 RX ORDER — SIMETHICONE 80 MG
80 TABLET,CHEWABLE ORAL 4 TIMES DAILY
Status: DISCONTINUED | OUTPATIENT
Start: 2019-12-12 | End: 2019-12-14 | Stop reason: HOSPADM

## 2019-12-12 RX ORDER — DIPHENHYDRAMINE HCL 25 MG
25 CAPSULE ORAL EVERY 4 HOURS PRN
Status: DISCONTINUED | OUTPATIENT
Start: 2019-12-12 | End: 2019-12-12 | Stop reason: SDUPTHER

## 2019-12-12 RX ORDER — ONDANSETRON 2 MG/ML
4 INJECTION INTRAMUSCULAR; INTRAVENOUS EVERY 6 HOURS PRN
Status: DISCONTINUED | OUTPATIENT
Start: 2019-12-12 | End: 2019-12-14 | Stop reason: HOSPADM

## 2019-12-12 RX ORDER — SIMETHICONE 80 MG
80 TABLET,CHEWABLE ORAL 4 TIMES DAILY PRN
Status: DISCONTINUED | OUTPATIENT
Start: 2019-12-12 | End: 2019-12-14 | Stop reason: HOSPADM

## 2019-12-12 RX ORDER — FERROUS SULFATE 325(65) MG
325 TABLET ORAL 2 TIMES DAILY WITH MEALS
Status: DISCONTINUED | OUTPATIENT
Start: 2019-12-12 | End: 2019-12-14 | Stop reason: HOSPADM

## 2019-12-12 RX ORDER — LANOLIN
CREAM (ML) TOPICAL
Status: DISCONTINUED | OUTPATIENT
Start: 2019-12-12 | End: 2019-12-14 | Stop reason: HOSPADM

## 2019-12-12 RX ORDER — DOCUSATE SODIUM 100 MG/1
100 CAPSULE, LIQUID FILLED ORAL 2 TIMES DAILY
Status: DISCONTINUED | OUTPATIENT
Start: 2019-12-12 | End: 2019-12-14 | Stop reason: HOSPADM

## 2019-12-12 RX ORDER — HYDROCODONE BITARTRATE AND ACETAMINOPHEN 10; 325 MG/1; MG/1
1 TABLET ORAL EVERY 4 HOURS PRN
Status: DISCONTINUED | OUTPATIENT
Start: 2019-12-12 | End: 2019-12-14 | Stop reason: HOSPADM

## 2019-12-12 RX ORDER — DIPHENHYDRAMINE HYDROCHLORIDE 50 MG/ML
25 INJECTION INTRAMUSCULAR; INTRAVENOUS EVERY 4 HOURS PRN
Status: DISCONTINUED | OUTPATIENT
Start: 2019-12-12 | End: 2019-12-14 | Stop reason: HOSPADM

## 2019-12-12 RX ORDER — DIPHENHYDRAMINE HCL 25 MG
25 CAPSULE ORAL EVERY 4 HOURS PRN
Status: DISCONTINUED | OUTPATIENT
Start: 2019-12-12 | End: 2019-12-14 | Stop reason: HOSPADM

## 2019-12-12 RX ORDER — IBUPROFEN 600 MG/1
600 TABLET ORAL EVERY 6 HOURS PRN
Status: DISCONTINUED | OUTPATIENT
Start: 2019-12-12 | End: 2019-12-14 | Stop reason: HOSPADM

## 2019-12-12 RX ORDER — CALCIUM CARBONATE 750 MG/1
1500 TABLET, CHEWABLE ORAL 3 TIMES DAILY PRN
Status: DISCONTINUED | OUTPATIENT
Start: 2019-12-12 | End: 2019-12-14 | Stop reason: HOSPADM

## 2019-12-12 RX ORDER — ONDANSETRON 4 MG/1
4 TABLET, FILM COATED ORAL EVERY 6 HOURS PRN
Status: DISCONTINUED | OUTPATIENT
Start: 2019-12-12 | End: 2019-12-14 | Stop reason: HOSPADM

## 2019-12-12 RX ORDER — ALUMINA, MAGNESIA, AND SIMETHICONE 2400; 2400; 240 MG/30ML; MG/30ML; MG/30ML
15 SUSPENSION ORAL EVERY 4 HOURS PRN
Status: DISCONTINUED | OUTPATIENT
Start: 2019-12-12 | End: 2019-12-14 | Stop reason: HOSPADM

## 2019-12-12 RX ORDER — PRENATAL VIT/IRON FUM/FOLIC AC 27MG-0.8MG
1 TABLET ORAL DAILY
Status: DISCONTINUED | OUTPATIENT
Start: 2019-12-12 | End: 2019-12-14 | Stop reason: HOSPADM

## 2019-12-12 RX ADMIN — HYDROCODONE BITARTRATE AND ACETAMINOPHEN 1 TABLET: 10; 325 TABLET ORAL at 22:05

## 2019-12-12 RX ADMIN — IBUPROFEN 600 MG: 600 TABLET, FILM COATED ORAL at 10:16

## 2019-12-12 RX ADMIN — IBUPROFEN 600 MG: 600 TABLET, FILM COATED ORAL at 15:53

## 2019-12-12 RX ADMIN — HYDROCODONE BITARTRATE AND ACETAMINOPHEN 1 TABLET: 5; 325 TABLET ORAL at 13:24

## 2019-12-12 RX ADMIN — HYDROCODONE BITARTRATE AND ACETAMINOPHEN 1 TABLET: 10; 325 TABLET ORAL at 03:17

## 2019-12-12 RX ADMIN — DOCUSATE SODIUM 100 MG: 100 CAPSULE, LIQUID FILLED ORAL at 10:16

## 2019-12-12 RX ADMIN — FERROUS SULFATE TAB 325 MG (65 MG ELEMENTAL FE) 325 MG: 325 (65 FE) TAB at 18:03

## 2019-12-12 RX ADMIN — SIMETHICONE CHEW TAB 80 MG 80 MG: 80 TABLET ORAL at 08:23

## 2019-12-12 RX ADMIN — Medication: at 22:05

## 2019-12-12 RX ADMIN — HYDROCODONE BITARTRATE AND ACETAMINOPHEN 1 TABLET: 5; 325 TABLET ORAL at 18:03

## 2019-12-12 RX ADMIN — PRENATAL VITAMINS-IRON FUMARATE 27 MG IRON-FOLIC ACID 0.8 MG TABLET 1 TABLET: at 21:05

## 2019-12-12 RX ADMIN — SIMETHICONE CHEW TAB 80 MG 80 MG: 80 TABLET ORAL at 13:24

## 2019-12-12 RX ADMIN — SIMETHICONE CHEW TAB 80 MG 80 MG: 80 TABLET ORAL at 21:05

## 2019-12-12 RX ADMIN — FERROUS SULFATE TAB 325 MG (65 MG ELEMENTAL FE) 325 MG: 325 (65 FE) TAB at 08:23

## 2019-12-12 RX ADMIN — IBUPROFEN 600 MG: 600 TABLET, FILM COATED ORAL at 03:17

## 2019-12-12 RX ADMIN — DOCUSATE SODIUM 100 MG: 100 CAPSULE, LIQUID FILLED ORAL at 21:05

## 2019-12-12 RX ADMIN — HYDROCODONE BITARTRATE AND ACETAMINOPHEN 1 TABLET: 10; 325 TABLET ORAL at 08:23

## 2019-12-12 RX ADMIN — IBUPROFEN 600 MG: 600 TABLET, FILM COATED ORAL at 22:05

## 2019-12-12 NOTE — ANESTHESIA POSTPROCEDURE EVALUATION
Patient: Valentine B Block    Procedure Summary     Date:  19 Room / Location:  [facility] LABOR DELIVERY   [facility]    Anesthesia Start:  840 Anesthesia Stop:      Procedure:   SECTION PRIMARY (N/A Abdomen) Diagnosis:      Surgeon:  Kimberly Barton MD Provider:  Tripp Collins MD    Anesthesia Type:  epidural ASA Status:  3          Anesthesia Type: epidural    Vitals  Vitals Value Taken Time   /71 2019  9:16 PM   Temp 98.4 °F (36.9 °C) 2019  7:16 PM   Pulse 79 2019 10:25 PM   Resp 16 2019  5:04 PM   SpO2 91 % 2019 10:25 PM   Vitals shown include unvalidated device data.        Post Anesthesia Care and Evaluation    Patient location during evaluation: bedside  Patient participation: complete - patient participated  Level of consciousness: awake and alert  Pain score: 0  Pain management: adequate  Airway patency: patent  Anesthetic complications: No anesthetic complications    Cardiovascular status: acceptable  Respiratory status: acceptable  Hydration status: acceptable

## 2019-12-12 NOTE — OP NOTE
Section Procedure Note    Valentine B Block    2019     Indications: 1. IUP at 39w2d   2. Arrest of descent   3.  Cephalopelvic disproportion    Pre-operative Diagnosis: 39w2d    Post-operative Diagnosis: same    Findings: CPD, copious amounts of peritoneal fluid present upon entry into abdominal cavity consisted with obstructed labor    Estimated Blood Loss:  300           Drains: Camejo                 Specimens: None               Complications:  None; patient tolerated the procedure well.           Disposition: PACU - hemodynamically stable.           Condition: stable    Surgeon: Kimberly Barton MD     Assistants: Dr. Dobbs    Anesthesia: Spinal anesthesia (epidural not functioning well)    ASA Class: 3    Procedure Details   The patient was seen in the Holding Room. The risks, benefits, complications, treatment options, and expected outcomes were discussed with the patient.  The patient concurred with the proposed plan, giving informed consent.  The site of surgery was properly noted. The patient was taken to the Operating Room, identified as Valentine B Block and the procedure verified as  Delivery. A Time Out was held and the above information confirmed.    After induction of anesthesia, the patient was draped and prepped in the usual sterile manner. A Pfannenstiel incision was made and carried down through the subcutaneous tissue to the fascia. A fascial incision was made and extended transversely. The fascia was  from the underlying rectus tissue superiorly and inferiorly. The peritoneum was identified and entered. The peritoneal incision was extended longitudinally.  A bladder flap was turned down.  A low transverse uterine incision was made. Delivered from vertex presentation was a female infant with a birth weight  PENDING per DRT with apgar scores PENDING per DRT        APGARS  One minute Five minutes Ten minutes Fifteen minutes Twenty minutes   Skin color:                  Heart rate:                 Grimace:                  Muscle tone:                  Breathing:                  Totals:                  . After the umbilical cord was clamped and cut, cord blood was obtained for evaluation. The placenta was removed intact and appeared normal. The uterine outline, tubes and ovaries appeared normal. The uterine incision was closed with running locked sutures of 1 Vicryl. Hemostasis was observed.  The fascia was then reapproximated with running sutures of 0 vicryl. The subcutaneous tissue was reapproximated with 3-0 plain. The skin was reapproximated with insorb subcuticular staples and Skin glue.    Instrument, sponge, and needle counts were correct prior the abdominal closure and at the conclusion of the case.         Kimberly Barton MD  10:27 PM  12/11/2019

## 2019-12-12 NOTE — PROGRESS NOTES
2019    Name:Valentine Gonzales    MR#:[id number]     PROGRESS NOTE:  Post-Op 1 S/P        Subjective   29 y.o. yo Female  s/p CS at 39w2d doing well. Pain well controlled, lochia appropriate    Patient Active Problem List   Diagnosis   • 38 weeks gestation of pregnancy   • S/P  section   • Failure to progress in labor        Objective    Vitals  Temp:  Temp:  [97.8 °F (36.6 °C)-99 °F (37.2 °C)] 97.8 °F (36.6 °C)  Temp src: Oral  BP:  BP: ()/(45-89) 108/51  Pulse:  Heart Rate:  [] 68  RR:   Resp:  [16-18] 18    General Awake, alert, no distress  Abdomen Soft, non-distended, fundus firm, below umbilicus, appropriately tender  Incision  Intact, no erythema or exudate  Extremities Calves NT bilaterally     I/O last 3 completed shifts:  In: 600 [I.V.:600]  Out: 2750 [Urine:2450; Blood:300]    LABS:   Lab Results   Component Value Date    WBC 20.50 (H) 2019    HGB 9.4 (L) 2019    HCT 30.3 (L) 2019    MCV 85.8 2019     2019       Infant: female       Assessment/Plan  1.  POD 1 from  Section  2.  Leukocytosis: no si/sx of infection.  Repeat CBC in AM to ensure WBC downtrending  3.  Anemia of pregnancy and blood loss anemia: asymptomatic.  Start po iron  4.  Routine cares: d/c IV, encourage ambulation, general diet              Kimberly Barton MD  2019 8:03 AM

## 2019-12-12 NOTE — LACTATION NOTE
12/12/19 0740   Maternal Information   Date of Referral 12/12/19   Person Making Referral other (see comments)  (courtesy)   Maternal Infant Feeding   Maternal Emotional State anxious   Equipment Type   Breast Pump Type double electric, personal   Reproductive Interventions   Breast Care: Breastfeeding frequency of feeding adjusted   Breastfeeding Assistance feeding cue recognition promoted;feeding on demand promoted;support offered   Breastfeeding Support encouragement provided   Coping/Psychosocial Interventions   Parent/Child Attachment Promotion cue recognition promoted;face-to-face positioning promoted;participation in care promoted;skin-to-skin contact encouraged   Supportive Measures counseling provided       Mom reports baby latched and nursed well at last feeding.  Teaching done, information given.  Encouraged mom to call for assistance as needed.  Mom has breast pump at home.

## 2019-12-12 NOTE — ANESTHESIA POSTPROCEDURE EVALUATION
Patient: Valentine B Block    Procedure Summary     Date:  19 Room / Location:  [facility] LABOR DELIVERY   [facility]    Anesthesia Start:  840 Anesthesia Stop:      Procedure:   SECTION PRIMARY (N/A Abdomen) Diagnosis:      Surgeon:  Kimberly Barton MD Provider:  Tripp Collins MD    Anesthesia Type:  epidural ASA Status:  3          Anesthesia Type: epidural    Vitals  Vitals Value Taken Time   /56 2019 11:00 AM   Temp 98.3 °F (36.8 °C) 2019 11:00 AM   Pulse 67 2019 11:00 AM   Resp 18 2019 11:00 AM   SpO2 96 % 2019 11:24 PM   Vitals shown include unvalidated device data.        Post Anesthesia Care and Evaluation    Patient location during evaluation: bedside  Patient participation: complete - patient participated  Level of consciousness: awake and alert  Pain management: adequate  Airway patency: patent  Anesthetic complications: No anesthetic complications    Cardiovascular status: acceptable  Respiratory status: acceptable  Hydration status: acceptable  Post Neuraxial Block status: Motor and sensory function returned to baseline and No signs or symptoms of PDPH

## 2019-12-12 NOTE — PROGRESS NOTES
SVE is unchanged over >12hrs.  She is now uncomfortable and feeling a lot of pressure and requests to proceed with PCS.  Discussed risks, benefits, and alternatives.  We discussed risks of blood loss, infection, damage to bowel, bladder, blood vessels, nerves, and ureters.  She is accepting of these risks and wishes to proceed with PCS.  Questions answered.  Ancef/zithromax preop    Kimberly Barton MD  9:27 PM

## 2019-12-12 NOTE — ANESTHESIA PROCEDURE NOTES
Spinal Block      Patient reassessed immediately prior to procedure    Patient location during procedure: OR  Start Time: 12/11/2019 9:44 PM  Stop Time: 12/11/2019 9:45 PM  Indication:at surgeon's request  Performed By  Anesthesiologist: Tripp Collins MD  Preanesthetic Checklist  Completed: patient identified, site marked, surgical consent, pre-op evaluation, timeout performed, IV checked, risks and benefits discussed and monitors and equipment checked  Spinal Block Prep:  Patient Position:sitting  Sterile Tech:cap, gloves, sterile barriers and mask  Prep:Betadine  Patient Monitoring:EKG, continuous pulse oximetry and blood pressure monitoring  Spinal Block Procedure  Approach:midline  Guidance:palpation technique  Location:L2-L3  Needle Type:Mary  Needle Gauge:25 G  Placement of Spinal needle event:cerebrospinal fluid aspirated  Paresthesia: no  Fluid Appearance:clear  Medications: bupivacaine in dextrose (MARCAINE SPINAL / Hyberbaric) 0.75-8.25 % injection, 1.4 mL  morphine PF (ASTRAMORPH) injection, 0.15 mg  fentaNYL citrate (PF) (SUBLIMAZE) injection, 15 mcg  Med Administered at 12/11/2019 9:44 PM   Post Assessment  Patient Tolerance:patient tolerated the procedure well with no apparent complications  Complications no

## 2019-12-12 NOTE — PLAN OF CARE
Mom reports baby latched and nursed well at last feeding.  Baby currently in nsy and mom to call for assistance with next feeding as needed.  Teaching done, information given.  Mom has a breast pump at home.

## 2019-12-13 LAB
DEPRECATED RDW RBC AUTO: 43.2 FL (ref 37–54)
ERYTHROCYTE [DISTWIDTH] IN BLOOD BY AUTOMATED COUNT: 13.9 % (ref 12.3–15.4)
HCT VFR BLD AUTO: 28.8 % (ref 34–46.6)
HGB BLD-MCNC: 9 G/DL (ref 12–15.9)
MCH RBC QN AUTO: 26.6 PG (ref 26.6–33)
MCHC RBC AUTO-ENTMCNC: 31.3 G/DL (ref 31.5–35.7)
MCV RBC AUTO: 85.2 FL (ref 79–97)
PLATELET # BLD AUTO: 178 10*3/MM3 (ref 140–450)
PMV BLD AUTO: 11.2 FL (ref 6–12)
RBC # BLD AUTO: 3.38 10*6/MM3 (ref 3.77–5.28)
WBC NRBC COR # BLD: 13.15 10*3/MM3 (ref 3.4–10.8)

## 2019-12-13 PROCEDURE — 85027 COMPLETE CBC AUTOMATED: CPT | Performed by: OBSTETRICS & GYNECOLOGY

## 2019-12-13 RX ADMIN — IBUPROFEN 600 MG: 600 TABLET, FILM COATED ORAL at 11:37

## 2019-12-13 RX ADMIN — HYDROCODONE BITARTRATE AND ACETAMINOPHEN 1 TABLET: 10; 325 TABLET ORAL at 12:59

## 2019-12-13 RX ADMIN — HYDROCODONE BITARTRATE AND ACETAMINOPHEN 1 TABLET: 10; 325 TABLET ORAL at 21:34

## 2019-12-13 RX ADMIN — HYDROCODONE BITARTRATE AND ACETAMINOPHEN 1 TABLET: 10; 325 TABLET ORAL at 09:08

## 2019-12-13 RX ADMIN — HYDROCODONE BITARTRATE AND ACETAMINOPHEN 1 TABLET: 10; 325 TABLET ORAL at 03:17

## 2019-12-13 RX ADMIN — SIMETHICONE CHEW TAB 80 MG 80 MG: 80 TABLET ORAL at 11:38

## 2019-12-13 RX ADMIN — SIMETHICONE CHEW TAB 80 MG 80 MG: 80 TABLET ORAL at 09:08

## 2019-12-13 RX ADMIN — HYDROCODONE BITARTRATE AND ACETAMINOPHEN 1 TABLET: 10; 325 TABLET ORAL at 17:25

## 2019-12-13 RX ADMIN — FERROUS SULFATE TAB 325 MG (65 MG ELEMENTAL FE) 325 MG: 325 (65 FE) TAB at 09:09

## 2019-12-13 RX ADMIN — SIMETHICONE CHEW TAB 80 MG 80 MG: 80 TABLET ORAL at 17:25

## 2019-12-13 RX ADMIN — DOCUSATE SODIUM 100 MG: 100 CAPSULE, LIQUID FILLED ORAL at 17:24

## 2019-12-13 RX ADMIN — FERROUS SULFATE TAB 325 MG (65 MG ELEMENTAL FE) 325 MG: 325 (65 FE) TAB at 17:25

## 2019-12-13 RX ADMIN — DOCUSATE SODIUM 100 MG: 100 CAPSULE, LIQUID FILLED ORAL at 09:09

## 2019-12-13 RX ADMIN — IBUPROFEN 600 MG: 600 TABLET, FILM COATED ORAL at 04:03

## 2019-12-13 RX ADMIN — PRENATAL VITAMINS-IRON FUMARATE 27 MG IRON-FOLIC ACID 0.8 MG TABLET 1 TABLET: at 09:09

## 2019-12-13 RX ADMIN — IBUPROFEN 600 MG: 600 TABLET, FILM COATED ORAL at 20:06

## 2019-12-13 NOTE — LACTATION NOTE
This note was copied from a baby's chart.     12/13/19 6640   Nutrition   Feeding Readiness Cues crying;eager   Feeding Method breastfeeding   Feeding Tolerance/Success coordinated suck;coordinated swallow   Feeding Interventions latch assistance provided;sucking promoted   Additional Documentation LATCH Score (Group)   Breastfeeding Session   Breastfeeding breastfeeding, right side only   Infant Positioning clutch/football   Effective Latch During Feeding yes   Suck/Swallow Coordination present   Signs of Milk Transfer audible swallow;infant jaw motion present   LATCH Score   Latch 2-->grasps breast, tongue down, lips flanged, rhythmic sucking   Audible Swallowing 1-->a few with stimulation   Type of Nipple 2-->everted (after stimulation)   Comfort (Breast/Nipple) 2-->soft/nontender   Hold (Positioning) 1-->minimal assist, teach one side, mother does other, staff holds   Latch Score 8

## 2019-12-13 NOTE — PLAN OF CARE
Problem: Patient Care Overview  Goal: Plan of Care Review  Outcome: Ongoing (interventions implemented as appropriate)  Flowsheets (Taken 12/13/2019 0213)  Progress: improving  Plan of Care Reviewed With: patient  Outcome Summary: VSS; labs stable; voiding and ambulating well; fundus firm, bleeding lgiht; incision well approximated; breastfeeding improving; pain controlled with oral meds

## 2019-12-13 NOTE — PROGRESS NOTES
2019    Name:Valentine Gonzales    MR#:[id number]     PROGRESS NOTE:  Post-Op 2 S/P        Subjective   29 y.o. yo Female  s/p CS at 39w2d doing well. Pain well controlled, lochia appropriate, tolerating diet.     Patient Active Problem List   Diagnosis   • 38 weeks gestation of pregnancy   • S/P  section   • Failure to progress in labor        Objective    Vitals  Temp:  Temp:  [97.6 °F (36.4 °C)-98.5 °F (36.9 °C)] 98.5 °F (36.9 °C)  Temp src: Oral  BP:  BP: (110-120)/(56-60) 120/58  Pulse:  Heart Rate:  [67-73] 68  RR:   Resp:  [16-18] 16    General Awake, alert, no distress  Abdomen Soft, non-distended, fundus firm, below umbilicus, appropriately tender  Incision  Intact, no erythema or exudate  Extremities Calves NT bilaterally     I/O last 3 completed shifts:  In: 600 [I.V.:600]  Out: 3200 [Urine:2900; Blood:300]    LABS:   Lab Results   Component Value Date    WBC 13.15 (H) 2019    HGB 9.0 (L) 2019    HCT 28.8 (L) 2019    MCV 85.2 2019     2019       Infant: female       Assessment/Plan  1.  POD 2 from  Section  2.  Leukocytosis: markedly improved.  No si/sx of infection.  3. Anemia of pregnancy and blood loss anemia: h/h stable.  Continue po iron.  Asymptomatic.  4. Plan d/c home tomorrow          Kimberly Barton MD  2019 7:30 AM

## 2019-12-13 NOTE — LACTATION NOTE
12/13/19 0830   Maternal Information   Date of Referral 12/13/19   Person Making Referral other (see comments)  (courtesy)   Maternal Reason for Referral breastfeeding currently   Maternal Assessment   Breast Size Issue none   Breast Shape Bilateral:;round   Breast Density Bilateral:;soft   Nipples Bilateral:;everted;short   Maternal Infant Feeding   Maternal Emotional State assist needed   Infant Positioning clutch/football   Signs of Milk Transfer audible swallow;infant jaw motion present   Pain with Feeding no   Comfort Measures Before/During Feeding infant position adjusted;latch adjusted;maternal position adjusted   Nipple Shape After Feeding, Right round;symmetrical;appropriately projected   Latch Assistance yes   Reproductive Interventions   Breastfeeding Assistance assisted with positioning;feeding session observed;infant latch-on verified;infant suck/swallow verified;support offered   Breastfeeding Support encouragement provided;lactation counseling provided

## 2019-12-13 NOTE — LACTATION NOTE
12/13/19 1600   Maternal Information   Date of Referral 12/13/19   Person Making Referral patient   Maternal Reason for Referral breastfeeding currently   Maternal Assessment   Breast Size Issue none   Breast Shape Bilateral:;round   Breast Density Bilateral:;soft   Nipples Bilateral:;graspable;short   Left Nipple Symptoms scabbed;tender   Right Nipple Symptoms tender   Maternal Infant Feeding   Maternal Emotional State anxious;assist needed   Infant Positioning cross-cradle   Signs of Milk Transfer audible swallow;infant jaw motion present   Pain with Feeding yes   Pain Location nipple, right   Pain Description soreness   Comfort Measures Before/During Feeding infant position adjusted;maternal position adjusted   Latch Assistance yes

## 2019-12-14 VITALS
SYSTOLIC BLOOD PRESSURE: 119 MMHG | HEART RATE: 64 BPM | TEMPERATURE: 98.4 F | RESPIRATION RATE: 16 BRPM | DIASTOLIC BLOOD PRESSURE: 64 MMHG | OXYGEN SATURATION: 96 %

## 2019-12-14 PROBLEM — Z3A.38 38 WEEKS GESTATION OF PREGNANCY: Status: RESOLVED | Noted: 2019-12-03 | Resolved: 2019-12-14

## 2019-12-14 RX ORDER — FERROUS SULFATE 325(65) MG
325 TABLET ORAL 2 TIMES DAILY WITH MEALS
Qty: 60 TABLET | Refills: 1 | Status: ON HOLD | OUTPATIENT
Start: 2019-12-14 | End: 2022-07-05 | Stop reason: SDUPTHER

## 2019-12-14 RX ORDER — PRENATAL VIT/IRON FUM/FOLIC AC 27MG-0.8MG
1 TABLET ORAL DAILY
Qty: 90 TABLET | Refills: 3 | Status: SHIPPED | OUTPATIENT
Start: 2019-12-14

## 2019-12-14 RX ORDER — IBUPROFEN 600 MG/1
600 TABLET ORAL EVERY 6 HOURS PRN
Qty: 60 TABLET | Refills: 1 | Status: ON HOLD | OUTPATIENT
Start: 2019-12-14 | End: 2022-07-05 | Stop reason: SDUPTHER

## 2019-12-14 RX ORDER — DOCUSATE SODIUM 100 MG/1
100 CAPSULE, LIQUID FILLED ORAL 2 TIMES DAILY
Qty: 60 CAPSULE | Refills: 1 | Status: ON HOLD | OUTPATIENT
Start: 2019-12-14 | End: 2022-07-05 | Stop reason: SDUPTHER

## 2019-12-14 RX ADMIN — HYDROCODONE BITARTRATE AND ACETAMINOPHEN 1 TABLET: 10; 325 TABLET ORAL at 10:23

## 2019-12-14 RX ADMIN — FERROUS SULFATE TAB 325 MG (65 MG ELEMENTAL FE) 325 MG: 325 (65 FE) TAB at 09:10

## 2019-12-14 RX ADMIN — HYDROCODONE BITARTRATE AND ACETAMINOPHEN 1 TABLET: 10; 325 TABLET ORAL at 06:09

## 2019-12-14 RX ADMIN — DOCUSATE SODIUM 100 MG: 100 CAPSULE, LIQUID FILLED ORAL at 09:10

## 2019-12-14 RX ADMIN — IBUPROFEN 600 MG: 600 TABLET, FILM COATED ORAL at 09:10

## 2019-12-14 RX ADMIN — PRENATAL VITAMINS-IRON FUMARATE 27 MG IRON-FOLIC ACID 0.8 MG TABLET 1 TABLET: at 09:09

## 2019-12-14 RX ADMIN — SIMETHICONE CHEW TAB 80 MG 80 MG: 80 TABLET ORAL at 09:10

## 2019-12-14 NOTE — PLAN OF CARE
Problem: Patient Care Overview  Goal: Plan of Care Review  12/14/2019 0459 by Christine Ivy, RN  Outcome: Ongoing (interventions implemented as appropriate)  Flowsheets  Taken 12/13/2019 0213 by Mary Kate Hill RN  Progress: improving  Outcome Summary: VSS; labs stable; voiding and ambulating well; fundus firm, bleeding lgiht; incision well approximated; breastfeeding improving; pain controlled with oral meds  Taken 12/13/2019 2006 by Christine Ivy, RN  Plan of Care Reviewed With: patient

## 2019-12-14 NOTE — PROGRESS NOTES
2019    Name:Valentine Gonzales    MR#:[id number]     PROGRESS NOTE:  Post-Op 3 S/P        Subjective   29 y.o. yo Female  s/p CS at 39w2d doing well. Pain well controlled, lochia appropriate, tolerating diet. She is breastfeeding and supplementing with formula.     Patient Active Problem List   Diagnosis   • S/P  section   • Failure to progress in labor        Objective    Vitals  Temp:  Temp:  [98.2 °F (36.8 °C)-98.6 °F (37 °C)] 98.4 °F (36.9 °C)  Temp src: Oral  BP:  BP: (106-133)/(54-64) 119/64  Pulse:  Heart Rate:  [62-81] 64  RR:   Resp:  [16-20] 16    General Awake, alert, no distress  Abdomen Soft, non-distended, fundus firm, below umbilicus, appropriately tender  Incision  Intact, no erythema or exudate  Extremities Calves NT bilaterally     No intake/output data recorded.    Lab Results   Component Value Date    WBC 13.15 (H) 2019    HGB 9.0 (L) 2019    HCT 28.8 (L) 2019    MCV 85.2 2019     2019    URICACID 4.2 12/10/2019    AST 17 12/10/2019    ALT 5 12/10/2019     (H) 12/10/2019    HEPBSAG Non-Reactive 2019     Results from last 7 days   Lab Units 12/10/19  9126   ABO TYPING  AB   RH TYPING  Negative   ANTIBODY SCREEN  Negative       Infant: female       Assessment   1.  POD 3 from  Section   2.  Postpartum anemia    Plan: Doing well.  Continue ferrous sulfate.   Consideration for d/c as clinically indicated.   D/C instructions given, precautions reviewed.        Delfino Mendez CNM  2019 8:12 AM

## 2019-12-14 NOTE — DISCHARGE SUMMARY
[facility]   Discharge Summary      Patient: Valentine Gonzales      MR#:[id number]  Admission  Diagnosis: <principal problem not specified>  Discharge Diagnosis: S/P low transverse  section.    Date of Admission: 12/10/2019  Date of Discharge:  2019    Procedures:  , Low Transverse     2019    9:59 PM      Service:  Obstetrics    Hospital Course:  Patient underwent  section and remained in the hospital for 4 days.  During that time she remained afebrile and hemodynamically stable.  On the day of discharge, she was eating, ambulating and voiding without difficulty.  She is breastfeeding and supplementing with formula.       Labs:    Lab Results (last 24 hours)     ** No results found for the last 24 hours. **          Discharge Medications     Discharge Medications      New Medications      Instructions Start Date   docusate sodium 100 MG capsule   100 mg, Oral, 2 Times Daily      ferrous sulfate 325 (65 FE) MG tablet   325 mg, Oral, 2 Times Daily With Meals      ibuprofen 600 MG tablet  Commonly known as:  ADVIL,MOTRIN   600 mg, Oral, Every 6 Hours PRN      prenatal vitamin 27-0.8 27-0.8 MG tablet tablet  Replaces:  Prenatal 27-1 27-1 MG tablet tablet   1 tablet, Oral, Daily         Stop These Medications    Magnesium 250 MG tablet     Prenatal 27-1 27-1 MG tablet tablet  Replaced by:  prenatal vitamin 27-0.8 27-0.8 MG tablet tablet        Written Rx for Norco 5/325 mg PO every 4 hours prn pain    Discharge Disposition:  To Home    Discharge Condition:  Stable. Postpartum anemia taking ferrous sulfate.     Discharge Diet: regular    Activity at Discharge: pelvic rest    Follow-up Appointments  Keep scheduled follow up for incision check in 3 weeks.      Delfino Mendez CNM  19  8:08 AM

## 2020-11-03 ENCOUNTER — HOSPITAL ENCOUNTER (OUTPATIENT)
Dept: GENERAL RADIOLOGY | Facility: HOSPITAL | Age: 30
Discharge: HOME | End: 2020-11-03
Admitting: PHYSICIAN ASSISTANT

## 2020-11-03 ENCOUNTER — TRANSCRIBE ORDERS (OUTPATIENT)
Dept: ADMINISTRATIVE | Facility: HOSPITAL | Age: 30
End: 2020-11-03

## 2020-11-03 DIAGNOSIS — S99.911A INJURY OF RIGHT ANKLE AND FOOT, INITIAL ENCOUNTER: ICD-10-CM

## 2020-11-03 DIAGNOSIS — S99.911A INJURY OF RIGHT ANKLE AND FOOT, INITIAL ENCOUNTER: Primary | ICD-10-CM

## 2020-11-03 DIAGNOSIS — S99.921A INJURY OF RIGHT ANKLE AND FOOT, INITIAL ENCOUNTER: ICD-10-CM

## 2020-11-03 DIAGNOSIS — S99.921A INJURY OF RIGHT ANKLE AND FOOT, INITIAL ENCOUNTER: Primary | ICD-10-CM

## 2020-11-03 PROCEDURE — 73610 X-RAY EXAM OF ANKLE: CPT

## 2021-11-17 ENCOUNTER — LAB (OUTPATIENT)
Dept: LAB | Facility: HOSPITAL | Age: 31
End: 2021-11-17

## 2021-11-17 ENCOUNTER — TRANSCRIBE ORDERS (OUTPATIENT)
Dept: LAB | Facility: HOSPITAL | Age: 31
End: 2021-11-17

## 2021-11-17 DIAGNOSIS — Z32.01 PREGNANCY EXAMINATION OR TEST, POSITIVE RESULT: ICD-10-CM

## 2021-11-17 DIAGNOSIS — Z32.01 PREGNANCY EXAMINATION OR TEST, POSITIVE RESULT: Primary | ICD-10-CM

## 2021-11-17 PROCEDURE — 84702 CHORIONIC GONADOTROPIN TEST: CPT

## 2021-11-17 PROCEDURE — 36415 COLL VENOUS BLD VENIPUNCTURE: CPT

## 2021-11-17 PROCEDURE — 84144 ASSAY OF PROGESTERONE: CPT

## 2021-11-18 LAB
HCG INTACT+B SERPL-ACNC: NORMAL MIU/ML
PROGEST SERPL-MCNC: 11.4 NG/ML

## 2021-11-19 ENCOUNTER — LAB (OUTPATIENT)
Dept: LAB | Facility: HOSPITAL | Age: 31
End: 2021-11-19

## 2021-11-19 DIAGNOSIS — Z32.01 PREGNANCY EXAMINATION OR TEST, POSITIVE RESULT: ICD-10-CM

## 2021-11-19 PROCEDURE — 36415 COLL VENOUS BLD VENIPUNCTURE: CPT

## 2021-11-19 PROCEDURE — 84702 CHORIONIC GONADOTROPIN TEST: CPT

## 2021-11-20 LAB — HCG INTACT+B SERPL-ACNC: NORMAL MIU/ML

## 2021-12-10 ENCOUNTER — LAB (OUTPATIENT)
Dept: LAB | Facility: HOSPITAL | Age: 31
End: 2021-12-10

## 2021-12-10 ENCOUNTER — TRANSCRIBE ORDERS (OUTPATIENT)
Dept: LAB | Facility: HOSPITAL | Age: 31
End: 2021-12-10

## 2021-12-10 DIAGNOSIS — Z3A.09 9 WEEKS GESTATION OF PREGNANCY: ICD-10-CM

## 2021-12-10 DIAGNOSIS — Z34.81 PRENATAL CARE, SUBSEQUENT PREGNANCY, FIRST TRIMESTER: Primary | ICD-10-CM

## 2021-12-10 DIAGNOSIS — Z34.81 PRENATAL CARE, SUBSEQUENT PREGNANCY, FIRST TRIMESTER: ICD-10-CM

## 2021-12-10 LAB
ABO GROUP BLD: NORMAL
AMPHET+METHAMPHET UR QL: NEGATIVE
AMPHETAMINES UR QL: NEGATIVE
BARBITURATES UR QL SCN: NEGATIVE
BASOPHILS # BLD AUTO: 0.02 10*3/MM3 (ref 0–0.2)
BASOPHILS NFR BLD AUTO: 0.2 % (ref 0–1.5)
BENZODIAZ UR QL SCN: NEGATIVE
BILIRUB UR QL STRIP: NEGATIVE
BLD GP AB SCN SERPL QL: NEGATIVE
BUPRENORPHINE SERPL-MCNC: NEGATIVE NG/ML
CANNABINOIDS SERPL QL: NEGATIVE
CLARITY UR: CLEAR
COCAINE UR QL: NEGATIVE
COLOR UR: YELLOW
CREAT UR-MCNC: 54.1 MG/DL
DEPRECATED RDW RBC AUTO: 42.3 FL (ref 37–54)
EOSINOPHIL # BLD AUTO: 0.05 10*3/MM3 (ref 0–0.4)
EOSINOPHIL NFR BLD AUTO: 0.5 % (ref 0.3–6.2)
ERYTHROCYTE [DISTWIDTH] IN BLOOD BY AUTOMATED COUNT: 13.7 % (ref 12.3–15.4)
GLUCOSE SERPL-MCNC: 79 MG/DL (ref 65–99)
GLUCOSE UR STRIP-MCNC: NEGATIVE MG/DL
HBV SURFACE AG SERPL QL IA: NORMAL
HCT VFR BLD AUTO: 36.9 % (ref 34–46.6)
HCV AB SER DONR QL: NORMAL
HGB BLD-MCNC: 12.3 G/DL (ref 12–15.9)
HGB UR QL STRIP.AUTO: NEGATIVE
HIV1+2 AB SER QL: NORMAL
IMM GRANULOCYTES # BLD AUTO: 0.04 10*3/MM3 (ref 0–0.05)
IMM GRANULOCYTES NFR BLD AUTO: 0.4 % (ref 0–0.5)
KETONES UR QL STRIP: NEGATIVE
LEUKOCYTE ESTERASE UR QL STRIP.AUTO: NEGATIVE
LYMPHOCYTES # BLD AUTO: 1.94 10*3/MM3 (ref 0.7–3.1)
LYMPHOCYTES NFR BLD AUTO: 19.9 % (ref 19.6–45.3)
MCH RBC QN AUTO: 28.1 PG (ref 26.6–33)
MCHC RBC AUTO-ENTMCNC: 33.3 G/DL (ref 31.5–35.7)
MCV RBC AUTO: 84.2 FL (ref 79–97)
METHADONE UR QL SCN: NEGATIVE
MONOCYTES # BLD AUTO: 0.75 10*3/MM3 (ref 0.1–0.9)
MONOCYTES NFR BLD AUTO: 7.7 % (ref 5–12)
NEUTROPHILS NFR BLD AUTO: 6.96 10*3/MM3 (ref 1.7–7)
NEUTROPHILS NFR BLD AUTO: 71.3 % (ref 42.7–76)
NITRITE UR QL STRIP: NEGATIVE
NRBC BLD AUTO-RTO: 0 /100 WBC (ref 0–0.2)
OPIATES UR QL: NEGATIVE
OXYCODONE UR QL SCN: NEGATIVE
PCP UR QL SCN: NEGATIVE
PH UR STRIP.AUTO: 7 [PH] (ref 5–8)
PLATELET # BLD AUTO: 243 10*3/MM3 (ref 140–450)
PMV BLD AUTO: 11.3 FL (ref 6–12)
PROPOXYPH UR QL: NEGATIVE
PROT UR QL STRIP: NEGATIVE
RBC # BLD AUTO: 4.38 10*6/MM3 (ref 3.77–5.28)
RH BLD: NEGATIVE
SP GR UR STRIP: 1.01 (ref 1–1.03)
TRICYCLICS UR QL SCN: NEGATIVE
TSH SERPL DL<=0.05 MIU/L-ACNC: 1.29 UIU/ML (ref 0.27–4.2)
UROBILINOGEN UR QL STRIP: NORMAL
WBC NRBC COR # BLD: 9.76 10*3/MM3 (ref 3.4–10.8)

## 2021-12-10 PROCEDURE — 86803 HEPATITIS C AB TEST: CPT

## 2021-12-10 PROCEDURE — 80306 DRUG TEST PRSMV INSTRMNT: CPT

## 2021-12-10 PROCEDURE — 84443 ASSAY THYROID STIM HORMONE: CPT

## 2021-12-10 PROCEDURE — 82947 ASSAY GLUCOSE BLOOD QUANT: CPT

## 2021-12-10 PROCEDURE — 36415 COLL VENOUS BLD VENIPUNCTURE: CPT

## 2021-12-10 PROCEDURE — 82570 ASSAY OF URINE CREATININE: CPT

## 2021-12-10 PROCEDURE — 80081 OBSTETRIC PANEL INC HIV TSTG: CPT

## 2021-12-10 PROCEDURE — 81003 URINALYSIS AUTO W/O SCOPE: CPT

## 2021-12-11 LAB
RPR SER QL: NORMAL
RUBV IGG SERPL IA-ACNC: 6.6 INDEX

## 2022-05-24 ENCOUNTER — HOSPITAL ENCOUNTER (OUTPATIENT)
Facility: HOSPITAL | Age: 32
Discharge: HOME | End: 2022-05-24
Attending: OBSTETRICS & GYNECOLOGY | Admitting: OBSTETRICS & GYNECOLOGY

## 2022-05-24 VITALS
WEIGHT: 277 LBS | HEIGHT: 62 IN | TEMPERATURE: 99 F | SYSTOLIC BLOOD PRESSURE: 128 MMHG | RESPIRATION RATE: 20 BRPM | DIASTOLIC BLOOD PRESSURE: 62 MMHG | BODY MASS INDEX: 50.97 KG/M2

## 2022-05-24 LAB
DEPRECATED RDW RBC AUTO: 37.2 FL (ref 37–54)
ERYTHROCYTE [DISTWIDTH] IN BLOOD BY AUTOMATED COUNT: 13.2 % (ref 12.3–15.4)
FETAL BLOOD VOL PATIENT KLEIH BETKE: 3.2 MLS
FETAL RBC/RBC BLD FC-RTO: 0.06 %
HCT VFR BLD AUTO: 31.8 % (ref 34–46.6)
HGB BLD-MCNC: 10.9 G/DL (ref 12–15.9)
HGB F BLD QL KLEIH BETKE: POSITIVE
MCH RBC QN AUTO: 26.7 PG (ref 26.6–33)
MCHC RBC AUTO-ENTMCNC: 34.3 G/DL (ref 31.5–35.7)
MCV RBC AUTO: 77.8 FL (ref 79–97)
PLATELET # BLD AUTO: 232 10*3/MM3 (ref 140–450)
PMV BLD AUTO: 11.1 FL (ref 6–12)
RBC # BLD AUTO: 4.09 10*6/MM3 (ref 3.77–5.28)
WBC NRBC COR # BLD: 13.68 10*3/MM3 (ref 3.4–10.8)

## 2022-05-24 PROCEDURE — 85027 COMPLETE CBC AUTOMATED: CPT | Performed by: OBSTETRICS & GYNECOLOGY

## 2022-05-24 PROCEDURE — 59025 FETAL NON-STRESS TEST: CPT

## 2022-05-24 PROCEDURE — G0463 HOSPITAL OUTPT CLINIC VISIT: HCPCS

## 2022-05-24 PROCEDURE — 96372 THER/PROPH/DIAG INJ SC/IM: CPT

## 2022-05-24 PROCEDURE — 85460 HEMOGLOBIN FETAL: CPT | Performed by: OBSTETRICS & GYNECOLOGY

## 2022-05-24 PROCEDURE — 25010000002 RHO D IMMUNE GLOBULIN 1500 UNIT/2ML SOLUTION PREFILLED SYRINGE: Performed by: OBSTETRICS & GYNECOLOGY

## 2022-05-24 PROCEDURE — 99214 OFFICE O/P EST MOD 30 MIN: CPT | Performed by: OBSTETRICS & GYNECOLOGY

## 2022-05-24 RX ADMIN — HUMAN RHO(D) IMMUNE GLOBULIN 1500 UNITS: 1500 SOLUTION INTRAMUSCULAR; INTRAVENOUS at 21:25

## 2022-05-25 NOTE — H&P
[facility]  Obstetric History and Physical    Chief Complaint   Patient presents with   • Fall       HPI:    Patient is a 31 y.o. female  currently at 33w2d, who presents after seeing her physician in the office and disclosing that she had fallen yesterday while shopping at [employer] around 1630 hrs on .   She was picking up a plat of water bottles when she lost her footing and fell on her knees and elbows.  She does not believe that she hit her abdomen.  She felt infant movements, denies having any vaginal bleeding or leaking.   She does have some mild cramping, but no overt abdominal pain or contractions.   She is Rhesus negative and did receive her routine Rhogam at 28 weeks.         The following portions of the patients history were reviewed and updated as appropriate: current medications, allergies, past medical history, past surgical history, past family history, past social history and problem list .       Prenatal Information:   Maternal Prenatal Labs  Blood Type No results found for: ABO   Rh Status No results found for: RH   Antibody Screen No results found for: ABSCRN   Gonnorhea No results found for: GCCX   Chlamydia No results found for: CLAMYDCU   RPR No results found for: RPR   Syphilis Antibody No results found for: SYPHILIS   Rubella No results found for: RUBELLAIGGIN   Hepatitis B Surface Antigen No results found for: HEPBSAG   HIV-1 Antibody No results found for: LABHIV1   Hepatitis C Antibody No results found for: HEPCAB   Rapid Urin Drug Screen No results found for: AMPMETHU, BARBITSCNUR, LABBENZSCN, LABMETHSCN, LABOPIASCN, THCURSCR, COCAINEUR, AMPHETSCREEN, PROPOXSCN, BUPRENORSCNU, METAMPSCNUR, OXYCODONESCN, TRICYCLICSCN   Group B Strep Culture No results found for: GBSANTIGEN           External Prenatal Results     Pregnancy Outside Results - Transcribed From Office Records - See Scanned Records For Details     Test Value Date Time    ABO  AB  12/10/21 1033    Rh  Negative  12/10/21  1033    Antibody Screen  Negative  12/10/21 1033    Varicella IgG       Rubella  6.60 index 12/10/21 1033    Hgb  10.9 g/dL 22 1827       12.3 g/dL 12/10/21 1033    Hct  31.8 % 22 1827       36.9 % 12/10/21 1033    Glucose Fasting GTT       Glucose Tolerance Test 1 hour       Glucose Tolerance Test 3 hour       Gonorrhea (discrete)       Chlamydia (discrete)       RPR  Non-Reactive  12/10/21 1033    VDRL       Syphilis Antibody       HBsAg  Non-Reactive  12/10/21 1033    Herpes Simplex Virus PCR       Herpes Simplex VIrus Culture       HIV  Non-Reactive  12/10/21 1033    Hep C RNA Quant PCR       Hep C Antibody  Non-Reactive  12/10/21 1033    AFP       Group B Strep       GBS Susceptibility to Clindamycin       GBS Susceptibility to Erythromycin       Fetal Fibronectin       Genetic Testing, Maternal Blood             Drug Screening     Test Value Date Time    Urine Drug Screen       Amphetamine Screen  Negative  12/10/21 1033    Barbiturate Screen  Negative  12/10/21 1033    Benzodiazepine Screen  Negative  12/10/21 1033    Methadone Screen  Negative  12/10/21 1033    Phencyclidine Screen  Negative  12/10/21 1033    Opiates Screen  Negative  12/10/21 1033    THC Screen  Negative  12/10/21 1033    Cocaine Screen       Propoxyphene Screen  Negative  12/10/21 1033    Buprenorphine Screen  Negative  12/10/21 1033    Methamphetamine Screen       Oxycodone Screen  Negative  12/10/21 1033    Tricyclic Antidepressants Screen  Negative  12/10/21 1033          Legend    ^: Historical                          Past OB History:     OB History    Para Term  AB Living   2 1 1 0 0 1   SAB IAB Ectopic Molar Multiple Live Births   0 0 0 0 0 1      # Outcome Date GA Lbr Binu/2nd Weight Sex Delivery Anes PTL Lv   2 Current            1 Term 19 39w2d  3700 g (8 lb 2.5 oz) F CS-LTranv EPI, Spinal N JES      Name: BLOCK,MEGANSGIRL      Apgar1: 8  Apgar5: 9       Past Medical History: Past Medical History:    Diagnosis Date   • Gestational hypertension    • Migraine    • Osteoarthritis     right foot   • Urinary tract infection     frequent as a child      Past Surgical History Past Surgical History:   Procedure Laterality Date   •  SECTION N/A 2019    Procedure:  SECTION PRIMARY;  Surgeon: Kimberly Barton MD;  Location: [facility] LABOR DELIVERY;  Service: Obstetrics/Gynecology   • MOUTH SURGERY     • TONSILLECTOMY     • WISDOM TOOTH EXTRACTION        Family History: Family History   Problem Relation Age of Onset   • No Known Problems Father    • No Known Problems Mother    • No Known Problems Brother    • No Known Problems Sister    • No Known Problems Son    • No Known Problems Daughter    • No Known Problems Paternal Grandfather    • No Known Problems Paternal Grandmother    • No Known Problems Maternal Grandmother    • No Known Problems Maternal Grandfather    • No Known Problems Maternal Aunt    • No Known Problems Maternal Uncle    • No Known Problems Paternal Aunt    • No Known Problems Paternal Uncle    • No Known Problems Other       Social History:  reports that she has never smoked. She has never used smokeless tobacco.   reports no history of alcohol use.   reports no history of drug use.        Review of Systems  Denies fever, HA, CP, Shortness of air, muscle weakness,                                             and rashes      Objective     Vital Signs Range for the last 24 hours  Temperature: Temp:  [99 °F (37.2 °C)] 99 °F (37.2 °C)   Temp Source: Temp src: Oral   BP: BP: (128)/(62) 128/62   Pulse:     Respirations: Resp:  [20] 20   SPO2:     O2 Amount (l/min):     O2 Devices     Weight: Weight:  [126 kg (277 lb)] 126 kg (277 lb)     Physical Examination: General appearance - alert, well appearing, and in no distress and oriented to person, place, and time  Chest - clear to auscultation, no wheezes, rales or rhonchi, symmetric air entry  Heart - S1 and S2 normal, no murmurs  noted  Abdomen - soft, nontender, nondistended, no masses or organomegaly  no rebound tenderness noted  bowel sounds normal  No guarding, No RUQ pain  Extremities - pedal edema  - none                           Fetal Heart Rate Assessment   Method: Fetal HR Assessment Method: external   Beats/min: Fetal HR (beats/min): 145   Baseline:     Varibility: Fetal HR Variability: moderate (amplitude range 6 to 25 bpm)   Accels: Fetal HR Accelerations: greater than/equal to 15 bpm, lasting at least 15 seconds   Decels: Fetal HR Decelerations: absent   Tracing Category:       Uterine Assessment   Method: Method: external tocotransducer, palpation   Frequency (min):     Ctx Count in 10 min:     Duration:     Intensity: Contraction Intensity: no contractions   Intensity by IUPC:     Resting Tone: Uterine Resting Tone: soft by palpation   Resting Tone by IUPC:           Laboratory Results:   Lab Results   Component Value Date     05/24/2022    HGB 10.9 (L) 05/24/2022    HCT 31.8 (L) 05/24/2022    WBC 13.68 (H) 05/24/2022         Assessment/Plan  1. Intrauterine pregnancy at 33w2d weeks gestation with reactive fetal status  2. S/P fall greater than 24 hours ago - no current S/S of abruption or fetal compromise  3.  K-B pending  If positive will administer appropriate amount of Rhogam    Update:    H-B positive.      Will administer Rhogam.    Reviewed S/S of abruption and  Concerns for her to return for further evaluation.,    Question answered.      D/C home.               Jakob Dobbs MD  5/24/2022  20:00 EDT

## 2022-07-03 ENCOUNTER — HOSPITAL ENCOUNTER (INPATIENT)
Facility: HOSPITAL | Age: 32
LOS: 2 days | Discharge: HOME | End: 2022-07-05
Attending: OBSTETRICS & GYNECOLOGY | Admitting: OBSTETRICS & GYNECOLOGY

## 2022-07-03 ENCOUNTER — ANESTHESIA EVENT (OUTPATIENT)
Dept: LABOR AND DELIVERY | Facility: HOSPITAL | Age: 32
End: 2022-07-03

## 2022-07-03 ENCOUNTER — ANESTHESIA (OUTPATIENT)
Dept: LABOR AND DELIVERY | Facility: HOSPITAL | Age: 32
End: 2022-07-03

## 2022-07-03 DIAGNOSIS — Z98.891 S/P CESAREAN SECTION: Primary | ICD-10-CM

## 2022-07-03 PROBLEM — Z34.90 PREGNANCY: Status: ACTIVE | Noted: 2022-07-03

## 2022-07-03 LAB
ABO GROUP BLD: NORMAL
ALP SERPL-CCNC: 154 U/L (ref 39–117)
ALT SERPL W P-5'-P-CCNC: 7 U/L (ref 1–33)
AST SERPL-CCNC: 11 U/L (ref 1–32)
BILIRUB SERPL-MCNC: 0.2 MG/DL (ref 0–1.2)
BLD GP AB SCN SERPL QL: POSITIVE
CREAT SERPL-MCNC: 0.74 MG/DL (ref 0.57–1)
DEPRECATED RDW RBC AUTO: 40.3 FL (ref 37–54)
ERYTHROCYTE [DISTWIDTH] IN BLOOD BY AUTOMATED COUNT: 13.9 % (ref 12.3–15.4)
EXPIRATION DATE: NORMAL
HCT VFR BLD AUTO: 35.7 % (ref 34–46.6)
HGB BLD-MCNC: 11.3 G/DL (ref 12–15.9)
LDH SERPL-CCNC: 133 U/L (ref 135–214)
Lab: NORMAL
MCH RBC QN AUTO: 25.4 PG (ref 26.6–33)
MCHC RBC AUTO-ENTMCNC: 31.7 G/DL (ref 31.5–35.7)
MCV RBC AUTO: 80.2 FL (ref 79–97)
PLATELET # BLD AUTO: 222 10*3/MM3 (ref 140–450)
PMV BLD AUTO: 11.8 FL (ref 6–12)
PROT UR STRIP-MCNC: NEGATIVE MG/DL
RBC # BLD AUTO: 4.45 10*6/MM3 (ref 3.77–5.28)
RESIDUAL RHIG DETECTED: NORMAL
RH BLD: NEGATIVE
SARS-COV-2 RDRP RESP QL NAA+PROBE: NORMAL
T&S EXPIRATION DATE: NORMAL
URATE SERPL-MCNC: 4.8 MG/DL (ref 2.4–5.7)
WBC NRBC COR # BLD: 14.71 10*3/MM3 (ref 3.4–10.8)

## 2022-07-03 PROCEDURE — 84550 ASSAY OF BLOOD/URIC ACID: CPT | Performed by: OBSTETRICS & GYNECOLOGY

## 2022-07-03 PROCEDURE — 86850 RBC ANTIBODY SCREEN: CPT | Performed by: OBSTETRICS & GYNECOLOGY

## 2022-07-03 PROCEDURE — 0 MORPHINE PER 10 MG: Performed by: ANESTHESIOLOGY

## 2022-07-03 PROCEDURE — 85027 COMPLETE CBC AUTOMATED: CPT | Performed by: OBSTETRICS & GYNECOLOGY

## 2022-07-03 PROCEDURE — 84450 TRANSFERASE (AST) (SGOT): CPT | Performed by: OBSTETRICS & GYNECOLOGY

## 2022-07-03 PROCEDURE — 83615 LACTATE (LD) (LDH) ENZYME: CPT | Performed by: OBSTETRICS & GYNECOLOGY

## 2022-07-03 PROCEDURE — 25010000002 ONDANSETRON PER 1 MG: Performed by: ANESTHESIOLOGY

## 2022-07-03 PROCEDURE — 81002 URINALYSIS NONAUTO W/O SCOPE: CPT | Performed by: OBSTETRICS & GYNECOLOGY

## 2022-07-03 PROCEDURE — 87635 SARS-COV-2 COVID-19 AMP PRB: CPT | Performed by: OBSTETRICS & GYNECOLOGY

## 2022-07-03 PROCEDURE — 25010000002 CEFAZOLIN PER 500 MG: Performed by: OBSTETRICS & GYNECOLOGY

## 2022-07-03 PROCEDURE — 88302 TISSUE EXAM BY PATHOLOGIST: CPT | Performed by: OBSTETRICS & GYNECOLOGY

## 2022-07-03 PROCEDURE — 86901 BLOOD TYPING SEROLOGIC RH(D): CPT | Performed by: OBSTETRICS & GYNECOLOGY

## 2022-07-03 PROCEDURE — 25010000002 AZITHROMYCIN PER 500 MG: Performed by: OBSTETRICS & GYNECOLOGY

## 2022-07-03 PROCEDURE — 25010000002 FENTANYL CITRATE (PF) 50 MCG/ML SOLUTION: Performed by: ANESTHESIOLOGY

## 2022-07-03 PROCEDURE — 25010000002 NALBUPHINE PER 10 MG: Performed by: ANESTHESIOLOGY

## 2022-07-03 PROCEDURE — 82247 BILIRUBIN TOTAL: CPT | Performed by: OBSTETRICS & GYNECOLOGY

## 2022-07-03 PROCEDURE — 82565 ASSAY OF CREATININE: CPT | Performed by: OBSTETRICS & GYNECOLOGY

## 2022-07-03 PROCEDURE — 99221 1ST HOSP IP/OBS SF/LOW 40: CPT | Performed by: OBSTETRICS & GYNECOLOGY

## 2022-07-03 PROCEDURE — 84075 ASSAY ALKALINE PHOSPHATASE: CPT | Performed by: OBSTETRICS & GYNECOLOGY

## 2022-07-03 PROCEDURE — 86900 BLOOD TYPING SEROLOGIC ABO: CPT | Performed by: OBSTETRICS & GYNECOLOGY

## 2022-07-03 PROCEDURE — 59025 FETAL NON-STRESS TEST: CPT

## 2022-07-03 PROCEDURE — 25010000002 METOCLOPRAMIDE PER 10 MG: Performed by: ANESTHESIOLOGY

## 2022-07-03 PROCEDURE — 86870 RBC ANTIBODY IDENTIFICATION: CPT | Performed by: OBSTETRICS & GYNECOLOGY

## 2022-07-03 PROCEDURE — 0UB70ZZ EXCISION OF BILATERAL FALLOPIAN TUBES, OPEN APPROACH: ICD-10-PCS | Performed by: OBSTETRICS & GYNECOLOGY

## 2022-07-03 PROCEDURE — 84460 ALANINE AMINO (ALT) (SGPT): CPT | Performed by: OBSTETRICS & GYNECOLOGY

## 2022-07-03 PROCEDURE — 25010000002 MIDAZOLAM PER 1 MG: Performed by: ANESTHESIOLOGY

## 2022-07-03 PROCEDURE — 25010000002 KETOROLAC TROMETHAMINE PER 15 MG: Performed by: OBSTETRICS & GYNECOLOGY

## 2022-07-03 RX ORDER — IBUPROFEN 600 MG/1
600 TABLET ORAL EVERY 6 HOURS
Status: DISCONTINUED | OUTPATIENT
Start: 2022-07-05 | End: 2022-07-05 | Stop reason: HOSPADM

## 2022-07-03 RX ORDER — CEFAZOLIN SODIUM IN 0.9 % NACL 3 G/100 ML
3 INTRAVENOUS SOLUTION, PIGGYBACK (ML) INTRAVENOUS ONCE
Status: COMPLETED | OUTPATIENT
Start: 2022-07-03 | End: 2022-07-03

## 2022-07-03 RX ORDER — OXYTOCIN/0.9 % SODIUM CHLORIDE 30/500 ML
650 PLASTIC BAG, INJECTION (ML) INTRAVENOUS ONCE
Status: DISCONTINUED | OUTPATIENT
Start: 2022-07-03 | End: 2022-07-03 | Stop reason: HOSPADM

## 2022-07-03 RX ORDER — SODIUM CHLORIDE 0.9 % (FLUSH) 0.9 %
10 SYRINGE (ML) INJECTION EVERY 12 HOURS SCHEDULED
Status: DISCONTINUED | OUTPATIENT
Start: 2022-07-03 | End: 2022-07-03 | Stop reason: HOSPADM

## 2022-07-03 RX ORDER — BUPIVACAINE HCL/0.9 % NACL/PF 0.125 %
PLASTIC BAG, INJECTION (ML) EPIDURAL AS NEEDED
Status: DISCONTINUED | OUTPATIENT
Start: 2022-07-03 | End: 2022-07-03 | Stop reason: SURG

## 2022-07-03 RX ORDER — OXYTOCIN/0.9 % SODIUM CHLORIDE 30/500 ML
PLASTIC BAG, INJECTION (ML) INTRAVENOUS AS NEEDED
Status: DISCONTINUED | OUTPATIENT
Start: 2022-07-03 | End: 2022-07-03 | Stop reason: SURG

## 2022-07-03 RX ORDER — ACETAMINOPHEN 500 MG
1000 TABLET ORAL ONCE
Status: COMPLETED | OUTPATIENT
Start: 2022-07-03 | End: 2022-07-03

## 2022-07-03 RX ORDER — KETOROLAC TROMETHAMINE 30 MG/ML
30 INJECTION, SOLUTION INTRAMUSCULAR; INTRAVENOUS ONCE
Status: COMPLETED | OUTPATIENT
Start: 2022-07-03 | End: 2022-07-03

## 2022-07-03 RX ORDER — SODIUM CHLORIDE, SODIUM LACTATE, POTASSIUM CHLORIDE, CALCIUM CHLORIDE 600; 310; 30; 20 MG/100ML; MG/100ML; MG/100ML; MG/100ML
125 INJECTION, SOLUTION INTRAVENOUS CONTINUOUS
Status: DISCONTINUED | OUTPATIENT
Start: 2022-07-03 | End: 2022-07-03

## 2022-07-03 RX ORDER — SIMETHICONE 80 MG
80 TABLET,CHEWABLE ORAL 4 TIMES DAILY PRN
Status: DISCONTINUED | OUTPATIENT
Start: 2022-07-03 | End: 2022-07-04

## 2022-07-03 RX ORDER — ACETAMINOPHEN 325 MG/1
650 TABLET ORAL EVERY 6 HOURS
Status: DISCONTINUED | OUTPATIENT
Start: 2022-07-04 | End: 2022-07-05 | Stop reason: HOSPADM

## 2022-07-03 RX ORDER — METOCLOPRAMIDE HYDROCHLORIDE 5 MG/ML
INJECTION INTRAMUSCULAR; INTRAVENOUS AS NEEDED
Status: DISCONTINUED | OUTPATIENT
Start: 2022-07-03 | End: 2022-07-03 | Stop reason: SURG

## 2022-07-03 RX ORDER — KETOROLAC TROMETHAMINE 15 MG/ML
15 INJECTION, SOLUTION INTRAMUSCULAR; INTRAVENOUS EVERY 6 HOURS
Status: COMPLETED | OUTPATIENT
Start: 2022-07-04 | End: 2022-07-04

## 2022-07-03 RX ORDER — PRENATAL VIT/IRON FUM/FOLIC AC 27MG-0.8MG
1 TABLET ORAL DAILY
Status: DISCONTINUED | OUTPATIENT
Start: 2022-07-03 | End: 2022-07-05 | Stop reason: HOSPADM

## 2022-07-03 RX ORDER — OXYCODONE HYDROCHLORIDE 5 MG/1
10 TABLET ORAL EVERY 4 HOURS PRN
Status: DISCONTINUED | OUTPATIENT
Start: 2022-07-03 | End: 2022-07-05 | Stop reason: HOSPADM

## 2022-07-03 RX ORDER — LIDOCAINE HYDROCHLORIDE 10 MG/ML
5 INJECTION, SOLUTION EPIDURAL; INFILTRATION; INTRACAUDAL; PERINEURAL AS NEEDED
Status: DISCONTINUED | OUTPATIENT
Start: 2022-07-03 | End: 2022-07-03 | Stop reason: HOSPADM

## 2022-07-03 RX ORDER — ACETAMINOPHEN 500 MG
1000 TABLET ORAL EVERY 6 HOURS
Status: COMPLETED | OUTPATIENT
Start: 2022-07-03 | End: 2022-07-04

## 2022-07-03 RX ORDER — OXYTOCIN/0.9 % SODIUM CHLORIDE 30/500 ML
85 PLASTIC BAG, INJECTION (ML) INTRAVENOUS ONCE
Status: DISCONTINUED | OUTPATIENT
Start: 2022-07-03 | End: 2022-07-03 | Stop reason: HOSPADM

## 2022-07-03 RX ORDER — CALCIUM CARBONATE 200(500)MG
1 TABLET,CHEWABLE ORAL EVERY 4 HOURS PRN
Status: DISCONTINUED | OUTPATIENT
Start: 2022-07-03 | End: 2022-07-05 | Stop reason: HOSPADM

## 2022-07-03 RX ORDER — FAMOTIDINE 10 MG/ML
INJECTION, SOLUTION INTRAVENOUS AS NEEDED
Status: DISCONTINUED | OUTPATIENT
Start: 2022-07-03 | End: 2022-07-03 | Stop reason: SURG

## 2022-07-03 RX ORDER — NALBUPHINE HCL 10 MG/ML
3 AMPUL (ML) INJECTION EVERY 4 HOURS PRN
Status: DISCONTINUED | OUTPATIENT
Start: 2022-07-03 | End: 2022-07-04

## 2022-07-03 RX ORDER — FENTANYL CITRATE 50 UG/ML
INJECTION, SOLUTION INTRAMUSCULAR; INTRAVENOUS AS NEEDED
Status: DISCONTINUED | OUTPATIENT
Start: 2022-07-03 | End: 2022-07-03 | Stop reason: SURG

## 2022-07-03 RX ORDER — METHYLERGONOVINE MALEATE 0.2 MG/ML
200 INJECTION INTRAVENOUS ONCE AS NEEDED
Status: DISCONTINUED | OUTPATIENT
Start: 2022-07-03 | End: 2022-07-03 | Stop reason: HOSPADM

## 2022-07-03 RX ORDER — ALUMINA, MAGNESIA, AND SIMETHICONE 2400; 2400; 240 MG/30ML; MG/30ML; MG/30ML
15 SUSPENSION ORAL EVERY 4 HOURS PRN
Status: DISCONTINUED | OUTPATIENT
Start: 2022-07-03 | End: 2022-07-05 | Stop reason: HOSPADM

## 2022-07-03 RX ORDER — ONDANSETRON 2 MG/ML
INJECTION INTRAMUSCULAR; INTRAVENOUS AS NEEDED
Status: DISCONTINUED | OUTPATIENT
Start: 2022-07-03 | End: 2022-07-03 | Stop reason: SURG

## 2022-07-03 RX ORDER — TRISODIUM CITRATE DIHYDRATE AND CITRIC ACID MONOHYDRATE 500; 334 MG/5ML; MG/5ML
30 SOLUTION ORAL ONCE
Status: COMPLETED | OUTPATIENT
Start: 2022-07-03 | End: 2022-07-03

## 2022-07-03 RX ORDER — DOCUSATE SODIUM 100 MG/1
100 CAPSULE, LIQUID FILLED ORAL 2 TIMES DAILY PRN
Status: DISCONTINUED | OUTPATIENT
Start: 2022-07-03 | End: 2022-07-04

## 2022-07-03 RX ORDER — HYDROCORTISONE 25 MG/G
1 CREAM TOPICAL AS NEEDED
Status: DISCONTINUED | OUTPATIENT
Start: 2022-07-03 | End: 2022-07-05 | Stop reason: HOSPADM

## 2022-07-03 RX ORDER — HEPARIN SODIUM 5000 [USP'U]/ML
5000 INJECTION, SOLUTION INTRAVENOUS; SUBCUTANEOUS EVERY 8 HOURS SCHEDULED
Status: DISCONTINUED | OUTPATIENT
Start: 2022-07-04 | End: 2022-07-05 | Stop reason: HOSPADM

## 2022-07-03 RX ORDER — MISOPROSTOL 200 UG/1
800 TABLET ORAL AS NEEDED
Status: DISCONTINUED | OUTPATIENT
Start: 2022-07-03 | End: 2022-07-03 | Stop reason: HOSPADM

## 2022-07-03 RX ORDER — OXYCODONE HYDROCHLORIDE 5 MG/1
5 TABLET ORAL EVERY 4 HOURS PRN
Status: DISCONTINUED | OUTPATIENT
Start: 2022-07-03 | End: 2022-07-05 | Stop reason: HOSPADM

## 2022-07-03 RX ORDER — MIDAZOLAM HYDROCHLORIDE 1 MG/ML
INJECTION INTRAMUSCULAR; INTRAVENOUS AS NEEDED
Status: DISCONTINUED | OUTPATIENT
Start: 2022-07-03 | End: 2022-07-03 | Stop reason: SURG

## 2022-07-03 RX ORDER — BUPIVACAINE HYDROCHLORIDE 7.5 MG/ML
INJECTION, SOLUTION INTRASPINAL AS NEEDED
Status: DISCONTINUED | OUTPATIENT
Start: 2022-07-03 | End: 2022-07-03 | Stop reason: SURG

## 2022-07-03 RX ORDER — MORPHINE SULFATE 0.5 MG/ML
INJECTION, SOLUTION EPIDURAL; INTRATHECAL; INTRAVENOUS AS NEEDED
Status: DISCONTINUED | OUTPATIENT
Start: 2022-07-03 | End: 2022-07-03 | Stop reason: SURG

## 2022-07-03 RX ORDER — HYDROMORPHONE HYDROCHLORIDE 1 MG/ML
0.5 INJECTION, SOLUTION INTRAMUSCULAR; INTRAVENOUS; SUBCUTANEOUS
Status: DISCONTINUED | OUTPATIENT
Start: 2022-07-03 | End: 2022-07-04

## 2022-07-03 RX ORDER — OXYTOCIN 10 [USP'U]/ML
INJECTION, SOLUTION INTRAMUSCULAR; INTRAVENOUS AS NEEDED
Status: DISCONTINUED | OUTPATIENT
Start: 2022-07-03 | End: 2022-07-03 | Stop reason: SURG

## 2022-07-03 RX ORDER — SODIUM CHLORIDE 0.9 % (FLUSH) 0.9 %
1-10 SYRINGE (ML) INJECTION AS NEEDED
Status: DISCONTINUED | OUTPATIENT
Start: 2022-07-03 | End: 2022-07-03 | Stop reason: HOSPADM

## 2022-07-03 RX ORDER — CARBOPROST TROMETHAMINE 250 UG/ML
250 INJECTION, SOLUTION INTRAMUSCULAR AS NEEDED
Status: DISCONTINUED | OUTPATIENT
Start: 2022-07-03 | End: 2022-07-03 | Stop reason: HOSPADM

## 2022-07-03 RX ORDER — SODIUM CHLORIDE, SODIUM LACTATE, POTASSIUM CHLORIDE, CALCIUM CHLORIDE 600; 310; 30; 20 MG/100ML; MG/100ML; MG/100ML; MG/100ML
INJECTION, SOLUTION INTRAVENOUS CONTINUOUS PRN
Status: DISCONTINUED | OUTPATIENT
Start: 2022-07-03 | End: 2022-07-03 | Stop reason: SURG

## 2022-07-03 RX ADMIN — MIDAZOLAM 1 MG: 1 INJECTION INTRAMUSCULAR; INTRAVENOUS at 15:44

## 2022-07-03 RX ADMIN — Medication 200 MCG: at 16:01

## 2022-07-03 RX ADMIN — Medication 100 MCG: at 16:27

## 2022-07-03 RX ADMIN — OXYTOCIN 3 UNITS: 10 INJECTION, SOLUTION INTRAMUSCULAR; INTRAVENOUS at 16:19

## 2022-07-03 RX ADMIN — SODIUM CHLORIDE, POTASSIUM CHLORIDE, SODIUM LACTATE AND CALCIUM CHLORIDE 1000 ML: 600; 310; 30; 20 INJECTION, SOLUTION INTRAVENOUS at 15:18

## 2022-07-03 RX ADMIN — ACETAMINOPHEN 1000 MG: 500 TABLET ORAL at 15:24

## 2022-07-03 RX ADMIN — MORPHINE SULFATE 0.15 MG: 0.5 INJECTION, SOLUTION EPIDURAL; INTRATHECAL; INTRAVENOUS at 15:52

## 2022-07-03 RX ADMIN — NALBUPHINE HYDROCHLORIDE 3 MG: 10 INJECTION, SOLUTION INTRAMUSCULAR; INTRAVENOUS; SUBCUTANEOUS at 17:35

## 2022-07-03 RX ADMIN — FAMOTIDINE 20 MG: 10 INJECTION, SOLUTION INTRAVENOUS at 15:44

## 2022-07-03 RX ADMIN — BUPIVACAINE HYDROCHLORIDE IN DEXTROSE 1.3 ML: 7.5 INJECTION, SOLUTION SUBARACHNOID at 15:52

## 2022-07-03 RX ADMIN — ONDANSETRON 4 MG: 2 INJECTION INTRAMUSCULAR; INTRAVENOUS at 15:44

## 2022-07-03 RX ADMIN — SODIUM CHLORIDE, POTASSIUM CHLORIDE, SODIUM LACTATE AND CALCIUM CHLORIDE 125 ML/HR: 600; 310; 30; 20 INJECTION, SOLUTION INTRAVENOUS at 15:38

## 2022-07-03 RX ADMIN — SODIUM CITRATE AND CITRIC ACID MONOHYDRATE 30 ML: 500; 334 SOLUTION ORAL at 15:38

## 2022-07-03 RX ADMIN — SODIUM CHLORIDE, POTASSIUM CHLORIDE, SODIUM LACTATE AND CALCIUM CHLORIDE: 600; 310; 30; 20 INJECTION, SOLUTION INTRAVENOUS at 15:42

## 2022-07-03 RX ADMIN — METOCLOPRAMIDE 10 MG: 5 INJECTION, SOLUTION INTRAMUSCULAR; INTRAVENOUS at 15:44

## 2022-07-03 RX ADMIN — OXYTOCIN 3 UNITS: 10 INJECTION, SOLUTION INTRAMUSCULAR; INTRAVENOUS at 16:16

## 2022-07-03 RX ADMIN — KETOROLAC TROMETHAMINE 30 MG: 30 INJECTION, SOLUTION INTRAMUSCULAR; INTRAVENOUS at 17:35

## 2022-07-03 RX ADMIN — MIDAZOLAM 1 MG: 1 INJECTION INTRAMUSCULAR; INTRAVENOUS at 16:21

## 2022-07-03 RX ADMIN — CEFAZOLIN 3 G: 10 INJECTION, POWDER, FOR SOLUTION INTRAVENOUS at 15:38

## 2022-07-03 RX ADMIN — FENTANYL CITRATE 20 MCG: 50 INJECTION, SOLUTION INTRAMUSCULAR; INTRAVENOUS at 15:52

## 2022-07-03 RX ADMIN — Medication 100 MCG: at 16:22

## 2022-07-03 RX ADMIN — OXYTOCIN 4 UNITS: 10 INJECTION, SOLUTION INTRAMUSCULAR; INTRAVENOUS at 16:13

## 2022-07-03 RX ADMIN — ACETAMINOPHEN 1000 MG: 500 TABLET ORAL at 22:07

## 2022-07-03 RX ADMIN — Medication 100 MCG: at 16:33

## 2022-07-03 RX ADMIN — Medication 100 MCG: at 16:09

## 2022-07-03 RX ADMIN — Medication 500 ML: at 16:25

## 2022-07-03 NOTE — ANESTHESIA PREPROCEDURE EVALUATION
Anesthesia Evaluation     Patient summary reviewed and Nursing notes reviewed   NPO Solid Status: > 6 hours  NPO Liquid Status: > 2 hours           Airway   Mallampati: II  TM distance: >3 FB  Neck ROM: full  No difficulty expected  Dental      Pulmonary - negative pulmonary ROS   Cardiovascular - negative cardio ROS        Neuro/Psych  (+) headaches,    GI/Hepatic/Renal/Endo    (+) morbid obesity,      Musculoskeletal     Abdominal    Substance History - negative use     OB/GYN    (+) Pregnant,     Comment: Urgent  for active labor with H/O .      Other   arthritis,                      Anesthesia Plan    ASA 3 - emergent     spinal and ITN       Anesthetic plan, risks, benefits, and alternatives have been provided, discussed and informed consent has been obtained with: patient.  Use of blood products discussed with patient .       CODE STATUS:    Level Of Support Discussed With: Patient  Code Status (Patient has no pulse and is not breathing): CPR (Attempt to Resuscitate)  Medical Interventions (Patient has pulse or is breathing): Full Support

## 2022-07-03 NOTE — ANESTHESIA PROCEDURE NOTES
Spinal Block      Patient reassessed immediately prior to procedure    Patient location during procedure: OB  Performed By  Anesthesiologist: Peña Dumont DO  Preanesthetic Checklist  Completed: patient identified, IV checked, site marked, risks and benefits discussed, surgical consent, monitors and equipment checked, pre-op evaluation and timeout performed  Spinal Block Prep:  Patient Position:sitting  Sterile Tech:cap, gloves, mask and sterile barriers  Prep:Chloraprep  Patient Monitoring:blood pressure monitoring, continuous pulse oximetry and EKG  Spinal Block Procedure  Approach:midline  Guidance:landmark technique and palpation technique  Location:L3-L4  Needle Type:Mary  Needle Gauge:25 G  Placement of Spinal needle event:cerebrospinal fluid aspirated  Paresthesia: no  Fluid Appearance:clear     Post Assessment  Patient Tolerance:patient tolerated the procedure well with no apparent complications  Complications no

## 2022-07-03 NOTE — ANESTHESIA POSTPROCEDURE EVALUATION
Patient: Valentine Quintero Block    Procedure Summary     Date: 22 Room / Location: [facility] LABOR DELIVERY   [facility]    Anesthesia Start: 154 Anesthesia Stop:     Procedure:  SECTION REPEAT WITH TUBAL (Bilateral Abdomen) Diagnosis:     Surgeons: Camacho Hinojosa DO Provider: Peña Dumont DO    Anesthesia Type: spinal, ITN ASA Status: 3 - Emergent          Anesthesia Type: spinal, ITN    Vitals  Vitals Value Taken Time   /50 22 1715   Temp 97.9 °F (36.6 °C) 22 1700   Pulse 58 22 1718   Resp 18 22 1700   SpO2 96 % 22 1718   Vitals shown include unvalidated device data.        Post Anesthesia Care and Evaluation    Patient location during evaluation: bedside  Patient participation: complete - patient participated  Level of consciousness: awake  Pain score: 0  Pain management: satisfactory to patient    Airway patency: patent  Anesthetic complications: No anesthetic complications  PONV Status: none  Cardiovascular status: acceptable and hemodynamically stable  Respiratory status: acceptable  Hydration status: acceptable

## 2022-07-03 NOTE — OP NOTE
Operative Note    Patient name: Valentine Gonzales  YOB: 1990   MRN: [id number]  Admission Date: 7/3/2022  Referring Provider: Kimberly Barton MD    ID: 31 y.o.  at 39w0d    Preoperative Diagnosis:   Patient Active Problem List   Diagnosis   • S/P  section   • Failure to progress in labor   • Pregnancy     31-year-old -0-0-1 at 39 weeks gestation  Labor  Prior  x1  Desires repeat  and bilateral total salpingectomy  Postoperative Diagnosis: Same as above.    Procedure(s): repeatlow transverse  delivery     Procedures:    *  SECTION REPEAT WITH TUBAL    Procedure(s):   SECTION REPEAT WITH TUBAL    Surgeons: Surgeon(s) and Role:     * Camacho Hinojosa, DO - Primary    Anesthesia: Spinal    Estimated Blood Loss: 600 mL    IV Fluids: 1500 mL      Preoperative antibiotic: cefazolin (Ancef)    Blood products:   Blood Administration Record (From admission, onward)    None          Pathology:   Order Name Source Comment Collection Info Order Time   TISSUE PATHOLOGY EXAM Fallopian Tubes, Bilateral  Collected By: Carleen Bowden RN 7/3/2022  4:21 PM     Specimen source(s):   Fallopian Tubes, Bilateral          Release to patient   Immediate            Drains: Camejo catheter to gravity    Complications: None    Condition: Stable to recovery room    Infant:                Gender: male  infant    Weight: 3836 g (8 lb 7.3 oz)     Apgars: 7  @ 1 minute /     8  @ 5 minutes    Cord gases: Venous:  No components found for:  PHCVEN,  BECVEN      Arterial:  No components found for:  PHCART,  BECART          Operative Summary:   After obtaining informed consent the patient was taken to the operating room where adequate anesthesia was obtained.  Camejo catheter was placed in the bladder preoperatively.  IV antibiotics were given preoperatively.       The abdomen was prepped and draped in the usual sterile fashion for  delivery.  After confirming  adequate anesthesia a Pfannenstiel skin incision was made with the scalpel and carried through to the underlying layer of fascia.  The fascia was incised in the midline and the incision extended laterally with the Ramsey scissors and with blunt dissection.       The upper aspect of the fascia was grasped with 2 Kocher clamps, elevated, and dissected off the underlying rectus muscles bluntly and with the Ramsey scissors.  The Kocher clamps were removed and applied to the inferior aspect of the fascia.  The fascia was dissected off of the rectus muscles in the same fashion.  The peritoneum was entered bluntly.  The incision was stretched and the bladder blade and Cage retractor inserted for visualization of the uterus.  Omental adhesions were encountered. they were taken down with sharp dissection electrocautery     The uterus was incised with the scalpel in a low transverse fashion.  The uterine incision was entered digitally and the incision extended bluntly in a cranial-caudal fashion.  Retractors were removed and membranes were ruptured.  The infant was delivered atraumatically from cephalic presentation.  The umbilical cord was clamped and cut and the nose and mouth bulb suctioned.  The infant was handed off to waiting pediatric staff.       Cord blood gases were collected from a clamped segment of umbilical cord.  Cord blood was not collected.  The placenta was removed using cord traction and uterine massage.  The uterus was exteriorized and cleared of all clots and debris.  The uterine incision was repaired with #1 Chromic gut in a running locked fashion. A single-layer technique was used.  Additional hemostatic measures required: none.    The incision was inspected and excellent hemostasis was noted.  The tubes and ovaries were noted to be normal.  The appendix The appendix was not visualized..  The right fallopian tube was grasped at the fimbriated.  Using DeBakey's and electrocautery the entire right  fallopian tube was excised along the mesosalpinx to the cornu.  Good hemostasis was obtained.  In the exact same fashion the opposite fallopian tube the left was excised in its entirety to the cornu.  Hemostasis was noted     The uterus was returned to the abdomen.  The gutters were cleared of all clots and debris.  Irrigation was used. The uterine incision and tubal sites were again inspected and found to be hemostatic.       The peritoneum was reapproximated with 2-0 Chromic gut.  The fascia was closed with 0 PDS in a running fashion.  The subcutaneous space was reapproximated using 3-0 Monocryl.      The skin was closed using 3-0 Prolene on a Jose L needle in a subcutaneous fashion.   The patient was transferred to the recovery room in stable condition.

## 2022-07-03 NOTE — H&P
[facility]  Obstetric History and Physical    Referring Provider: Kimberly Barton MD      Chief Complaint   Patient presents with   • Contractions       Subjective     Patient is a 31 y.o. female  currently at 39w0d, who presents with contractions.  Patient reports regular contractions throughout the day without associate leaking of fluid or vaginal bleeding.  Patient reports normal fetal activity..  Patient observed for 1 hour fetal tracing reactive with regular uterine contractions.  Cervix changed from 2 to 3 cm.  Patient planned for a repeat  total salpingectomy this week.  Discussed with patient methodology risk procedure including risk infection, bleeding, damage bowel bladder, need further surgery, risk of anesthesia, need for blood products, and bilateral salpingectomy permanent considered nonreversible.  All questions answered informed consent obtained.    Her prenatal care is complicated by  prior   desires repeat .  Her previous obstetric/gynecological history is remarkable for .    The following portions of the patients history were reviewed and updated as appropriate: current medications, allergies, past medical history, past surgical history, past family history, past social history and problem list .       Prenatal Information:   Maternal Prenatal Labs  Blood Type No results found for: ABO   Rh Status No results found for: RH   Antibody Screen No results found for: ABSCRN   Gonnorhea No results found for: GCCX   Chlamydia No results found for: CLAMYDCU   RPR No results found for: RPR   Syphilis Antibody No results found for: SYPHILIS   Rubella No results found for: RUBELLAIGGIN   Hepatitis B Surface Antigen No results found for: HEPBSAG   HIV-1 Antibody No results found for: LABHIV1   Hepatitis C Antibody No results found for: HEPCAB   Rapid Urin Drug Screen No results found for: AMPMETHU, BARBITSCNUR, LABBENZSCN, LABMETHSCN, LABOPIASCN, THCURSCR, COCAINEUR,  AMPHETSCREEN, PROPOXSCN, BUPRENORSCNU, METAMPSCNUR, OXYCODONESCN, TRICYCLICSCN   Group B Strep Culture No results found for: GBSANTIGEN           External Prenatal Results     Pregnancy Outside Results - Transcribed From Office Records - See Scanned Records For Details     Test Value Date Time    ABO  AB  12/10/21 1033    Rh  Negative  12/10/21 1033    Antibody Screen  Negative  12/10/21 1033    Varicella IgG       Rubella  6.60 index 12/10/21 1033    Hgb  10.9 g/dL 22 1827       12.3 g/dL 12/10/21 1033    Hct  31.8 % 22 1827       36.9 % 12/10/21 1033    Glucose Fasting GTT       Glucose Tolerance Test 1 hour       Glucose Tolerance Test 3 hour       Gonorrhea (discrete)       Chlamydia (discrete)       RPR  Non-Reactive  12/10/21 1033    VDRL       Syphilis Antibody       HBsAg  Non-Reactive  12/10/21 1033    Herpes Simplex Virus PCR       Herpes Simplex VIrus Culture       HIV  Non-Reactive  12/10/21 1033    Hep C RNA Quant PCR       Hep C Antibody  Non-Reactive  12/10/21 1033    AFP       Group B Strep       GBS Susceptibility to Clindamycin       GBS Susceptibility to Erythromycin       Fetal Fibronectin       Genetic Testing, Maternal Blood             Drug Screening     Test Value Date Time    Urine Drug Screen       Amphetamine Screen  Negative  12/10/21 1033    Barbiturate Screen  Negative  12/10/21 1033    Benzodiazepine Screen  Negative  12/10/21 1033    Methadone Screen  Negative  12/10/21 1033    Phencyclidine Screen  Negative  12/10/21 1033    Opiates Screen  Negative  12/10/21 1033    THC Screen  Negative  12/10/21 1033    Cocaine Screen       Propoxyphene Screen  Negative  12/10/21 1033    Buprenorphine Screen  Negative  12/10/21 1033    Methamphetamine Screen       Oxycodone Screen  Negative  12/10/21 1033    Tricyclic Antidepressants Screen  Negative  12/10/21 1033          Legend    ^: Historical                          Past OB History:       OB History    Para Term  AB  Living   2 1 1 0 0 1   SAB IAB Ectopic Molar Multiple Live Births   0 0 0 0 0 1      # Outcome Date GA Lbr Binu/2nd Weight Sex Delivery Anes PTL Lv   2 Current            1 Term 19 39w2d  3700 g (8 lb 2.5 oz) F CS-LTranv EPI, Spinal N JES      Name: LIBAN,MEGANSGIRL      Apgar1: 8  Apgar5: 9       Past Medical History: Past Medical History:   Diagnosis Date   • Gestational hypertension    • Migraine    • Osteoarthritis     right foot   • Urinary tract infection     frequent as a child      Past Surgical History Past Surgical History:   Procedure Laterality Date   •  SECTION N/A 2019    Procedure:  SECTION PRIMARY;  Surgeon: Kimberly Barton MD;  Location: [facility] LABOR DELIVERY;  Service: Obstetrics/Gynecology   • MOUTH SURGERY     • TONSILLECTOMY     • WISDOM TOOTH EXTRACTION        Family History: Family History   Problem Relation Age of Onset   • No Known Problems Father    • No Known Problems Mother    • No Known Problems Brother    • No Known Problems Sister    • No Known Problems Son    • No Known Problems Daughter    • No Known Problems Paternal Grandfather    • No Known Problems Paternal Grandmother    • No Known Problems Maternal Grandmother    • No Known Problems Maternal Grandfather    • No Known Problems Maternal Aunt    • No Known Problems Maternal Uncle    • No Known Problems Paternal Aunt    • No Known Problems Paternal Uncle    • No Known Problems Other       Social History:  reports that she has never smoked. She has never used smokeless tobacco.   reports no history of alcohol use.   reports no history of drug use.                   General ROS Negative Findings:Headaches, Visual Changes, Epigastric pain, Anorexia, Nausia/Vomiting, ROM and Vaginal Bleeding    ROS     All other systems have been reviewed and are neg  Objective       Vital Signs Range for the last 24 hours  Temperature: Temp:  [98.5 °F (36.9 °C)] 98.5 °F (36.9 °C)   Temp Source: Temp src: Oral   BP: BP:  (130)/(72) 130/72   Pulse: Heart Rate:  [62] 62   Respirations: Resp:  [18] 18   SPO2: SpO2:  [98 %] 98 %   O2 Amount (l/min):     O2 Devices     Weight: Weight:  [127 kg (280 lb)] 127 kg (280 lb)     Physical Examination:   General:   alert, appears stated age and cooperative   Skin:   normal   HEENT:     Lungs:   clear to auscultation bilaterally   Heart:   regular rate and rhythm, S1, S2 normal, no murmur, click, rub or gallop   Gastrointestinal:  Abdomen soft, gravid uterus nontender, guarding benign exam   Lower Extremities:  1+ edema   : Exam deferred.   Musculoskeletal:     Neuro:  No focal deficits, DTR 2+4 no clonus         Presentation: vtx   Cervix: Exam by: Method: sterile exam per RN   Dilation:  3 cm   Effacement: Cervical Effacement: 70%   Station:         Fetal Heart Rate Assessment   Method: Fetal HR Assessment Method: external   Beats/min: Fetal HR (beats/min): 135   Baseline: Fetal HR Baseline: normal range   Varibility: Fetal HR Variability: moderate (amplitude range 6 to 25 bpm)   Accels: Fetal HR Accelerations: greater than/equal to 15 bpm, lasting at least 15 seconds   Decels: Fetal HR Decelerations: absent   Tracing Category:       Uterine Assessment   Method: Method: external tocotransducer   Frequency (min): Contraction Frequency (Minutes): 3-4   Ctx Count in 10 min:     Duration:     Intensity:     Intensity by IUPC:     Resting Tone:     Resting Tone by IUPC:     C[place]den Units:       Laboratory Results:   Lab Results (last 24 hours)     Procedure Component Value Units Date/Time    POC Protein, Urine, Qualitative, Dipstick [[id number]]  (Normal) Collected: 07/03/22 1334    Specimen: Urine Updated: 07/03/22 1335     Protein, POC Negative mg/dL      Lot Number [id number]     Expiration Date 2024/01/31        Radiology Review:   Imaging Results (Last 24 Hours)     ** No results found for the last 24 hours. **        Other Studies:    Assessment & Plan       Pregnancy        Assessment:  1.   Intrauterine pregnancy at 39w0d weeks gestation with reactive fetal status.    2.  Labor  3.  Prior   4.  Desires repeat  with bilateral total salpingectomy    Plan:  1.  Admit, prep for repeat  with bilateral total salpingectomy.  All questions answered informed consent obtained  2. Plan of care has been reviewed with patient.  3.  Risks, benefits of treatment plan have been discussed.  4.  All questions have been answered.  5      Camacho Hinojosa,   7/3/2022  14:38 EDT

## 2022-07-04 LAB
BASOPHILS # BLD AUTO: 0.02 10*3/MM3 (ref 0–0.2)
BASOPHILS NFR BLD AUTO: 0.2 % (ref 0–1.5)
DEPRECATED RDW RBC AUTO: 40.8 FL (ref 37–54)
EOSINOPHIL # BLD AUTO: 0.04 10*3/MM3 (ref 0–0.4)
EOSINOPHIL NFR BLD AUTO: 0.4 % (ref 0.3–6.2)
ERYTHROCYTE [DISTWIDTH] IN BLOOD BY AUTOMATED COUNT: 14.1 % (ref 12.3–15.4)
HCT VFR BLD AUTO: 31.9 % (ref 34–46.6)
HGB BLD-MCNC: 10.2 G/DL (ref 12–15.9)
IMM GRANULOCYTES # BLD AUTO: 0.04 10*3/MM3 (ref 0–0.05)
IMM GRANULOCYTES NFR BLD AUTO: 0.4 % (ref 0–0.5)
LYMPHOCYTES # BLD AUTO: 1.89 10*3/MM3 (ref 0.7–3.1)
LYMPHOCYTES NFR BLD AUTO: 17.5 % (ref 19.6–45.3)
MCH RBC QN AUTO: 25.4 PG (ref 26.6–33)
MCHC RBC AUTO-ENTMCNC: 32 G/DL (ref 31.5–35.7)
MCV RBC AUTO: 79.4 FL (ref 79–97)
MONOCYTES # BLD AUTO: 0.56 10*3/MM3 (ref 0.1–0.9)
MONOCYTES NFR BLD AUTO: 5.2 % (ref 5–12)
NEUTROPHILS NFR BLD AUTO: 76.3 % (ref 42.7–76)
NEUTROPHILS NFR BLD AUTO: 8.26 10*3/MM3 (ref 1.7–7)
NRBC BLD AUTO-RTO: 0 /100 WBC (ref 0–0.2)
PLATELET # BLD AUTO: 193 10*3/MM3 (ref 140–450)
PMV BLD AUTO: 12.4 FL (ref 6–12)
RBC # BLD AUTO: 4.02 10*6/MM3 (ref 3.77–5.28)
WBC NRBC COR # BLD: 10.81 10*3/MM3 (ref 3.4–10.8)

## 2022-07-04 PROCEDURE — 85025 COMPLETE CBC W/AUTO DIFF WBC: CPT | Performed by: OBSTETRICS & GYNECOLOGY

## 2022-07-04 PROCEDURE — 25010000002 HEPARIN (PORCINE) PER 1000 UNITS: Performed by: OBSTETRICS & GYNECOLOGY

## 2022-07-04 PROCEDURE — 25010000002 KETOROLAC TROMETHAMINE PER 15 MG: Performed by: OBSTETRICS & GYNECOLOGY

## 2022-07-04 RX ORDER — SIMETHICONE 80 MG
80 TABLET,CHEWABLE ORAL
Status: DISCONTINUED | OUTPATIENT
Start: 2022-07-04 | End: 2022-07-05 | Stop reason: HOSPADM

## 2022-07-04 RX ORDER — DOCUSATE SODIUM 100 MG/1
100 CAPSULE, LIQUID FILLED ORAL 2 TIMES DAILY
Status: DISCONTINUED | OUTPATIENT
Start: 2022-07-04 | End: 2022-07-05 | Stop reason: HOSPADM

## 2022-07-04 RX ADMIN — Medication 1 APPLICATION: at 15:46

## 2022-07-04 RX ADMIN — KETOROLAC TROMETHAMINE 15 MG: 15 INJECTION, SOLUTION INTRAMUSCULAR; INTRAVENOUS at 06:23

## 2022-07-04 RX ADMIN — ACETAMINOPHEN 1000 MG: 500 TABLET ORAL at 09:45

## 2022-07-04 RX ADMIN — DOCUSATE SODIUM 100 MG: 100 CAPSULE, LIQUID FILLED ORAL at 09:45

## 2022-07-04 RX ADMIN — ACETAMINOPHEN 1000 MG: 500 TABLET ORAL at 15:46

## 2022-07-04 RX ADMIN — DOCUSATE SODIUM 100 MG: 100 CAPSULE, LIQUID FILLED ORAL at 21:37

## 2022-07-04 RX ADMIN — OXYCODONE 5 MG: 5 TABLET ORAL at 18:56

## 2022-07-04 RX ADMIN — HEPARIN SODIUM 5000 UNITS: 5000 INJECTION, SOLUTION INTRAVENOUS; SUBCUTANEOUS at 21:37

## 2022-07-04 RX ADMIN — PRENATAL VITAMINS-IRON FUMARATE 27 MG IRON-FOLIC ACID 0.8 MG TABLET 1 TABLET: at 09:45

## 2022-07-04 RX ADMIN — ACETAMINOPHEN 650 MG: 325 TABLET ORAL at 21:37

## 2022-07-04 RX ADMIN — SIMETHICONE 80 MG: 80 TABLET, CHEWABLE ORAL at 09:45

## 2022-07-04 RX ADMIN — SIMETHICONE 80 MG: 80 TABLET, CHEWABLE ORAL at 21:37

## 2022-07-04 RX ADMIN — ACETAMINOPHEN 1000 MG: 500 TABLET ORAL at 03:37

## 2022-07-04 RX ADMIN — KETOROLAC TROMETHAMINE 15 MG: 15 INJECTION, SOLUTION INTRAMUSCULAR; INTRAVENOUS at 13:15

## 2022-07-04 RX ADMIN — HEPARIN SODIUM 5000 UNITS: 5000 INJECTION, SOLUTION INTRAVENOUS; SUBCUTANEOUS at 06:00

## 2022-07-04 RX ADMIN — KETOROLAC TROMETHAMINE 15 MG: 15 INJECTION, SOLUTION INTRAMUSCULAR; INTRAVENOUS at 00:21

## 2022-07-04 RX ADMIN — KETOROLAC TROMETHAMINE 15 MG: 15 INJECTION, SOLUTION INTRAMUSCULAR; INTRAVENOUS at 18:49

## 2022-07-04 RX ADMIN — HEPARIN SODIUM 5000 UNITS: 5000 INJECTION, SOLUTION INTRAVENOUS; SUBCUTANEOUS at 13:15

## 2022-07-04 RX ADMIN — HYDROCORTISONE 1 APPLICATION: 25 CREAM TOPICAL at 06:01

## 2022-07-04 NOTE — LACTATION NOTE
22 1114   Maternal Information   Date of Referral 22   Person Making Referral patient   Maternal Reason for Referral latch difficulty  (left side)   Infant Reason for Referral  infant;other (see comments)  (baby is chomping when attempting to elicit suckle, only gums making contact with finger without suction applied during assessment)   Maternal Assessment   Breast Shape Bilateral:;wide   Breast Density Bilateral:;soft   Nipples Bilateral:;short   Left Nipple Symptoms tender   Right Nipple Symptoms tender   Maternal Infant Feeding   Maternal Emotional State independent;receptive   Infant Positioning clutch/football  (left)   Signs of Milk Transfer other (see comments)  (few suckles)   Pain with Feeding no  (with small shield used)   Comfort Measures Before/During Feeding infant position adjusted;latch adjusted;maternal position adjusted  (small nipple shield with proper placement education/demonstrated completed)   Latch Assistance minimal assistance   Support Person Involvement actively supporting mother   Milk Expression/Equipment   Breast Pump Type double electric, personal  (pt reports she has a motif wendy at home that family member can bring)   Breast Pumping   Breast Pumping Interventions post-feed pumping encouraged  (for short/missed feedings, if supplementation is required, or if breastfeeding becomes too painful to encourage breastmilk production)

## 2022-07-04 NOTE — PROGRESS NOTES
MARSHALL Castro   PROGRESS NOTE      Subjective     Patient reports:   Doing well, pain controlled with medication, ambulating around the room, avelina po    Objective      Vitals: Vital Signs Range for the last 24 hours  Temperature: Temp:  [97.5 °F (36.4 °C)-98.6 °F (37 °C)] 98.2 °F (36.8 °C)   Temp Source: Temp src: Oral   BP: BP: (103-130)/(50-72) 112/54   Pulse: Heart Rate:  [46-74] 56   Respirations: Resp:  [16-18] 18   SPO2: SpO2:  [94 %-98 %] 98 %   O2 Amount (l/min):     O2 Devices            Physical Exam    Lungs clear to auscultation bilaterally   Abdomen Soft, non-tender, normal bowel sounds; no bruits, organomegaly or masses.   Incision  healing well, no drainage, no erythema, no hernia, no seroma, no swelling, well approximated   Extremities extremities normal, atraumatic, no cyanosis or edema     LABS:  Lab Results   Component Value Date    WBC 14.71 (H) 2022    HGB 11.3 (L) 2022    HCT 35.7 2022    MCV 80.2 2022     2022       Assessment & Plan        Pregnancy      Assessment:    Valentine Quintero Block is Day 1  post-partum        Plan:  continue post op care.        Diamond Salazar CNM  2022  10:08 EDT

## 2022-07-04 NOTE — LACTATION NOTE
07/03/22 1950   Maternal Information   Person Making Referral lactation consultant   Maternal Reason for Referral   (attmpeted to breastfeed first baby but had trouble latching and quit pumping before milk came in)   Infant Reason for Referral   (states baby has fed at least 1 time since delivery)   Milk Expression/Equipment   Breast Pump Type double electric, personal  (mom has a personal pump at home--can't remember what kind but thinks maybe a specra S2)   Breast Pumping   Breast Pumping Interventions   (can pump every 3 hours if baby doesn't latch; or can pump for missed or short feedings, if baby is latching)   Teaching done as documented under Education. Encouraged skin to skin. To call for lactation services, if mom wants help with a feeding or if she has questions or concerns.

## 2022-07-04 NOTE — PLAN OF CARE
Problem: Adjustment to Role Transition (Postpartum  Delivery)  Goal: Successful Maternal Role Transition  Intervention: Support Maternal Role Transition  Recent Flowsheet Documentation  Taken 7/3/2022 1950 by Rosalino Ma APRN  Supportive Measures: active listening utilized  Parent/Child Attachment Promotion:   caring behavior modeled   cue recognition promoted   participation in care promoted   skin-to-skin contact encouraged   strengths emphasized   positive reinforcement provided     Problem: Breastfeeding  Goal: Effective Breastfeeding  Outcome: Ongoing, Progressing  Intervention: Support Exclusive Breastfeeding Success  Flowsheets (Taken 7/3/2022 1950)  Supportive Measures: active listening utilized  Breastfeeding Support:   diary/feeding log utilized   encouragement provided   lactation counseling provided  Intervention: Promote Effective Breastfeeding  Flowsheets (Taken 7/3/2022 1950)  Breastfeeding Assistance:   feeding cue recognition promoted   feeding on demand promoted   support offered  Parent/Child Attachment Promotion:   caring behavior modeled   cue recognition promoted   participation in care promoted   skin-to-skin contact encouraged   strengths emphasized   positive reinforcement provided   Goal Outcome Evaluation:

## 2022-07-05 ENCOUNTER — APPOINTMENT (OUTPATIENT)
Dept: PREADMISSION TESTING | Facility: HOSPITAL | Age: 32
End: 2022-07-05

## 2022-07-05 VITALS
DIASTOLIC BLOOD PRESSURE: 59 MMHG | BODY MASS INDEX: 51.53 KG/M2 | WEIGHT: 280 LBS | TEMPERATURE: 97.9 F | HEIGHT: 62 IN | SYSTOLIC BLOOD PRESSURE: 122 MMHG | OXYGEN SATURATION: 98 % | RESPIRATION RATE: 18 BRPM | HEART RATE: 60 BPM

## 2022-07-05 PROBLEM — Z34.90 PREGNANCY: Status: RESOLVED | Noted: 2022-07-03 | Resolved: 2022-07-05

## 2022-07-05 PROCEDURE — 25010000002 HEPARIN (PORCINE) PER 1000 UNITS: Performed by: OBSTETRICS & GYNECOLOGY

## 2022-07-05 RX ORDER — SIMETHICONE 80 MG
80 TABLET,CHEWABLE ORAL
Start: 2022-07-05

## 2022-07-05 RX ORDER — FERROUS SULFATE 325(65) MG
325 TABLET ORAL 2 TIMES DAILY WITH MEALS
Qty: 90 TABLET | Refills: 0 | Status: SHIPPED | OUTPATIENT
Start: 2022-07-05

## 2022-07-05 RX ORDER — ACETAMINOPHEN 325 MG/1
650 TABLET ORAL EVERY 6 HOURS
Start: 2022-07-05

## 2022-07-05 RX ORDER — IBUPROFEN 600 MG/1
600 TABLET ORAL EVERY 6 HOURS PRN
Qty: 60 TABLET | Refills: 1 | Status: SHIPPED | OUTPATIENT
Start: 2022-07-05

## 2022-07-05 RX ORDER — OXYCODONE HYDROCHLORIDE 5 MG/1
5 TABLET ORAL EVERY 4 HOURS PRN
Qty: 10 TABLET | Refills: 0 | Status: SHIPPED | OUTPATIENT
Start: 2022-07-05 | End: 2022-07-10

## 2022-07-05 RX ORDER — DOCUSATE SODIUM 100 MG/1
100 CAPSULE, LIQUID FILLED ORAL 2 TIMES DAILY PRN
Qty: 30 CAPSULE | Refills: 1 | Status: SHIPPED | OUTPATIENT
Start: 2022-07-05

## 2022-07-05 RX ADMIN — IBUPROFEN 600 MG: 600 TABLET ORAL at 13:14

## 2022-07-05 RX ADMIN — ACETAMINOPHEN 650 MG: 325 TABLET ORAL at 09:59

## 2022-07-05 RX ADMIN — SIMETHICONE 80 MG: 80 TABLET, CHEWABLE ORAL at 08:10

## 2022-07-05 RX ADMIN — PRENATAL VITAMINS-IRON FUMARATE 27 MG IRON-FOLIC ACID 0.8 MG TABLET 1 TABLET: at 08:10

## 2022-07-05 RX ADMIN — ACETAMINOPHEN 650 MG: 325 TABLET ORAL at 04:03

## 2022-07-05 RX ADMIN — IBUPROFEN 600 MG: 600 TABLET ORAL at 01:29

## 2022-07-05 RX ADMIN — IBUPROFEN 600 MG: 600 TABLET ORAL at 05:54

## 2022-07-05 RX ADMIN — SIMETHICONE 80 MG: 80 TABLET, CHEWABLE ORAL at 11:31

## 2022-07-05 RX ADMIN — OXYCODONE 10 MG: 5 TABLET ORAL at 13:14

## 2022-07-05 RX ADMIN — DOCUSATE SODIUM 100 MG: 100 CAPSULE, LIQUID FILLED ORAL at 08:10

## 2022-07-05 RX ADMIN — HEPARIN SODIUM 5000 UNITS: 5000 INJECTION, SOLUTION INTRAVENOUS; SUBCUTANEOUS at 13:14

## 2022-07-05 RX ADMIN — HEPARIN SODIUM 5000 UNITS: 5000 INJECTION, SOLUTION INTRAVENOUS; SUBCUTANEOUS at 05:55

## 2022-07-05 RX ADMIN — OXYCODONE 10 MG: 5 TABLET ORAL at 08:10

## 2022-07-05 NOTE — DISCHARGE SUMMARY
[facility]   Discharge Summary      Patient: Valentine Gonzales      MR#:[id number]  Admission  Diagnosis:   Problems Addressed this Visit        Gravid and     S/P  section - Primary    Relevant Orders    Suture Removal      Diagnoses       Codes Comments    S/P  section    -  Primary ICD-10-CM: Z98.891  ICD-9-CM: V45.89           Discharge Diagnosis:   1. S/P  section        Date of Admission: 7/3/2022  Date of Discharge:  2022    Procedures:  , Low Transverse     7/3/2022    4:12 PM      Service:  Obstetrics    Hospital Course:  Patient underwent  section and remained in the hospital for 2 days.  During that time she remained afebrile and hemodynamically stable.  On the day of discharge, she was eating, ambulating and voiding without difficulty.      Labs  Lab Results   Component Value Date    WBC 10.81 (H) 2022    HGB 10.2 (L) 2022    HCT 31.9 (L) 2022    MCV 79.4 2022     2022    URICACID 4.8 2022    AST 11 2022    ALT 7 2022     (L) 2022     Results from last 7 days   Lab Units 22  1451   ABO TYPING  AB   RH TYPING  Negative   ANTIBODY SCREEN  Positive       Discharge Medications     Discharge Medications      New Medications      Instructions Start Date   acetaminophen 325 MG tablet  Commonly known as: TYLENOL   650 mg, Oral, Every 6 Hours      simethicone 80 MG chewable tablet  Commonly known as: MYLICON   80 mg, Oral, 4 Times Daily Before Meals & Nightly         Continue These Medications      Instructions Start Date   ferrous sulfate 325 (65 FE) MG tablet   325 mg, Oral, 2 Times Daily With Meals      ibuprofen 600 MG tablet  Commonly known as: ADVIL,MOTRIN   600 mg, Oral, Every 6 Hours PRN      prenatal vitamin 27-0.8 27-0.8 MG tablet tablet   1 tablet, Oral, Daily      sertraline 50 MG tablet  Commonly known as: ZOLOFT   50 mg, Oral, Daily      Stool Softener 100 MG  capsule  Generic drug: docusate sodium   100 mg, Oral, 2 Times Daily         Stop These Medications    HYDROcodone-acetaminophen 5-325 MG per tablet  Commonly known as: NORCO            Discharge Disposition:  To Home    Discharge Condition:  Stable    Discharge Diet: regular    Activity at Discharge: pelvic rest    Follow-up Appointments  2 weeks with Dr. Kimberly Palacios CNM  07/05/22  09:14 EDT

## 2022-07-05 NOTE — LACTATION NOTE
07/05/22 1100   Maternal Information   Date of Referral 07/05/22   Person Making Referral lactation consultant  (follow up prior to discharge)   Maternal Reason for Referral other (see comments)  (pt reports she gave up and has decided to no longer breastfeed, plans to be formula feeding; answered all questions and provided support and lactation follow up as needed if milk comes in)

## 2022-07-05 NOTE — PROGRESS NOTES
2022    Name:Valentine Gonzales    MR#:[id number]     PROGRESS NOTE:  Post-Op 2 S/P        Subjective   31 y.o. yo Female  s/p CS at 39w0d doing well. Pain well controlled, lochia appropriate, tolerating diet. Voiding and BM without difficulty. Requests to go home today    Patient Active Problem List   Diagnosis   • S/P  section   • Postpartum anemia        Objective    Vitals  Temp:  Temp:  [97.7 °F (36.5 °C)-98.7 °F (37.1 °C)] 98.4 °F (36.9 °C)  Temp src: Oral  BP:  BP: (114-131)/(56-80) 131/80  Pulse:  Heart Rate:  [51-73] 73  RR:   Resp:  [16-18] 16    General Awake, alert, no distress  Abdomen Soft, non-distended, fundus firm, below umbilicus, appropriately tender  Incision  Intact, no erythema or exudate  Extremities Calves NT bilaterally     I/O last 3 completed shifts:  In: -   Out: 2245 [Urine:2245]    Lab Results   Component Value Date    WBC 10.81 (H) 2022    HGB 10.2 (L) 2022    HCT 31.9 (L) 2022    MCV 79.4 2022     2022    URICACID 4.8 2022    AST 11 2022    ALT 7 2022     (L) 2022    HEPBSAG Non-Reactive 12/10/2021     Results from last 7 days   Lab Units 22  1451   ABO TYPING  AB   RH TYPING  Negative   ANTIBODY SCREEN  Positive       Infant: male       Assessment   1.  POD 2 from  Section    Plan: Doing well.    Discharge home with standard care instructions.  Return to office 2 weeks with Dr. Kimberly Palacios CNM  2022 09:08 EDT

## 2022-07-06 ENCOUNTER — HOSPITAL ENCOUNTER (OUTPATIENT)
Facility: HOSPITAL | Age: 32
End: 2022-07-06
Attending: OBSTETRICS & GYNECOLOGY | Admitting: OBSTETRICS & GYNECOLOGY

## 2022-07-06 ENCOUNTER — APPOINTMENT (OUTPATIENT)
Dept: CARDIOLOGY | Facility: HOSPITAL | Age: 32
End: 2022-07-06

## 2022-07-06 ENCOUNTER — HOSPITAL ENCOUNTER (OUTPATIENT)
Facility: HOSPITAL | Age: 32
Setting detail: OBSERVATION
Discharge: HOME | End: 2022-07-06
Attending: OBSTETRICS & GYNECOLOGY | Admitting: OBSTETRICS & GYNECOLOGY

## 2022-07-06 VITALS
HEART RATE: 61 BPM | HEIGHT: 62 IN | SYSTOLIC BLOOD PRESSURE: 125 MMHG | OXYGEN SATURATION: 98 % | RESPIRATION RATE: 18 BRPM | WEIGHT: 271 LBS | BODY MASS INDEX: 49.87 KG/M2 | DIASTOLIC BLOOD PRESSURE: 59 MMHG | TEMPERATURE: 98.2 F

## 2022-07-06 LAB
ALP SERPL-CCNC: 121 U/L (ref 39–117)
ALT SERPL W P-5'-P-CCNC: 11 U/L (ref 1–33)
AST SERPL-CCNC: 21 U/L (ref 1–32)
BH CV LOWER VASCULAR LEFT COMMON FEMORAL AUGMENT: NORMAL
BH CV LOWER VASCULAR LEFT COMMON FEMORAL COMPETENT: NORMAL
BH CV LOWER VASCULAR LEFT COMMON FEMORAL COMPRESS: NORMAL
BH CV LOWER VASCULAR LEFT COMMON FEMORAL PHASIC: NORMAL
BH CV LOWER VASCULAR LEFT COMMON FEMORAL SPONT: NORMAL
BH CV LOWER VASCULAR LEFT DISTAL FEMORAL COMPRESS: NORMAL
BH CV LOWER VASCULAR LEFT GASTRONEMIUS COMPRESS: NORMAL
BH CV LOWER VASCULAR LEFT GREATER SAPH AK COMPRESS: NORMAL
BH CV LOWER VASCULAR LEFT GREATER SAPH BK COMPRESS: NORMAL
BH CV LOWER VASCULAR LEFT LESSER SAPH COMPRESS: NORMAL
BH CV LOWER VASCULAR LEFT MID FEMORAL AUGMENT: NORMAL
BH CV LOWER VASCULAR LEFT MID FEMORAL COMPETENT: NORMAL
BH CV LOWER VASCULAR LEFT MID FEMORAL COMPRESS: NORMAL
BH CV LOWER VASCULAR LEFT MID FEMORAL PHASIC: NORMAL
BH CV LOWER VASCULAR LEFT MID FEMORAL SPONT: NORMAL
BH CV LOWER VASCULAR LEFT PERONEAL COMPRESS: NORMAL
BH CV LOWER VASCULAR LEFT POPLITEAL AUGMENT: NORMAL
BH CV LOWER VASCULAR LEFT POPLITEAL COMPETENT: NORMAL
BH CV LOWER VASCULAR LEFT POPLITEAL COMPRESS: NORMAL
BH CV LOWER VASCULAR LEFT POPLITEAL PHASIC: NORMAL
BH CV LOWER VASCULAR LEFT POPLITEAL SPONT: NORMAL
BH CV LOWER VASCULAR LEFT POSTERIOR TIBIAL COMPRESS: NORMAL
BH CV LOWER VASCULAR LEFT PROFUNDA FEMORAL COMPRESS: NORMAL
BH CV LOWER VASCULAR LEFT PROXIMAL FEMORAL COMPRESS: NORMAL
BH CV LOWER VASCULAR LEFT SAPHENOFEMORAL JUNCTION COMPRESS: NORMAL
BH CV LOWER VASCULAR RIGHT COMMON FEMORAL AUGMENT: NORMAL
BH CV LOWER VASCULAR RIGHT COMMON FEMORAL COMPETENT: NORMAL
BH CV LOWER VASCULAR RIGHT COMMON FEMORAL COMPRESS: NORMAL
BH CV LOWER VASCULAR RIGHT COMMON FEMORAL PHASIC: NORMAL
BH CV LOWER VASCULAR RIGHT COMMON FEMORAL SPONT: NORMAL
BILIRUB SERPL-MCNC: 0.2 MG/DL (ref 0–1.2)
CREAT SERPL-MCNC: 0.63 MG/DL (ref 0.57–1)
CYTO UR: NORMAL
DEPRECATED RDW RBC AUTO: 40.7 FL (ref 37–54)
ERYTHROCYTE [DISTWIDTH] IN BLOOD BY AUTOMATED COUNT: 14.2 % (ref 12.3–15.4)
HCT VFR BLD AUTO: 31.2 % (ref 34–46.6)
HGB BLD-MCNC: 10 G/DL (ref 12–15.9)
LAB AP CASE REPORT: NORMAL
LAB AP CLINICAL INFORMATION: NORMAL
LDH SERPL-CCNC: 184 U/L (ref 135–214)
MAXIMAL PREDICTED HEART RATE: 191 BPM
MCH RBC QN AUTO: 25.4 PG (ref 26.6–33)
MCHC RBC AUTO-ENTMCNC: 32.1 G/DL (ref 31.5–35.7)
MCV RBC AUTO: 79.4 FL (ref 79–97)
PATH REPORT.FINAL DX SPEC: NORMAL
PATH REPORT.GROSS SPEC: NORMAL
PLATELET # BLD AUTO: 232 10*3/MM3 (ref 140–450)
PMV BLD AUTO: 11 FL (ref 6–12)
RBC # BLD AUTO: 3.93 10*6/MM3 (ref 3.77–5.28)
STRESS TARGET HR: 162 BPM
URATE SERPL-MCNC: 4.3 MG/DL (ref 2.4–5.7)
WBC NRBC COR # BLD: 8.78 10*3/MM3 (ref 3.4–10.8)

## 2022-07-06 PROCEDURE — 82247 BILIRUBIN TOTAL: CPT | Performed by: OBSTETRICS & GYNECOLOGY

## 2022-07-06 PROCEDURE — 83615 LACTATE (LD) (LDH) ENZYME: CPT | Performed by: OBSTETRICS & GYNECOLOGY

## 2022-07-06 PROCEDURE — 82565 ASSAY OF CREATININE: CPT | Performed by: OBSTETRICS & GYNECOLOGY

## 2022-07-06 PROCEDURE — 93971 EXTREMITY STUDY: CPT

## 2022-07-06 PROCEDURE — 84550 ASSAY OF BLOOD/URIC ACID: CPT | Performed by: OBSTETRICS & GYNECOLOGY

## 2022-07-06 PROCEDURE — G0378 HOSPITAL OBSERVATION PER HR: HCPCS

## 2022-07-06 PROCEDURE — 99218 PR INITIAL OBSERVATION CARE/DAY 30 MINUTES: CPT | Performed by: OBSTETRICS & GYNECOLOGY

## 2022-07-06 PROCEDURE — 85027 COMPLETE CBC AUTOMATED: CPT | Performed by: OBSTETRICS & GYNECOLOGY

## 2022-07-06 PROCEDURE — 84075 ASSAY ALKALINE PHOSPHATASE: CPT | Performed by: OBSTETRICS & GYNECOLOGY

## 2022-07-06 PROCEDURE — 84460 ALANINE AMINO (ALT) (SGPT): CPT | Performed by: OBSTETRICS & GYNECOLOGY

## 2022-07-06 PROCEDURE — 93971 EXTREMITY STUDY: CPT | Performed by: INTERNAL MEDICINE

## 2022-07-06 PROCEDURE — 84450 TRANSFERASE (AST) (SGOT): CPT | Performed by: OBSTETRICS & GYNECOLOGY

## 2022-07-06 NOTE — H&P
[facility]  Obstetric History and Physical    Referring Provider: Kimberly Barton MD      Chief Complaint   Patient presents with   • Elevated Blood Pressure       Subjective     Patient is a 31 y.o. female  currently postop day #3 after repeat  with bilateral total salpingectomy, who presents with complaint of elevated blood pressure.  Patient had uneventful operative immediate postpartum course discharged home on postop day 2.  Patient was seen in the office today by Dr. Villarreal.  She complained of elevated blood pressure this morning 150s over 80s and a mild headache.  Blood pressure is mildly elevated as well and the office.  Patient also complains of some tightness and pain in her left calf.  Patient currently denies shortness of breath, chest pain, fever, epigastric pain, increased vaginal bleeding, any concerns.  Patient states discharged home yesterday.  Patient states did not sleep well last night because the baby was fussy.  Patient also complaining of left calf pain.  Of note, patient received prophylactic heparin postoperatively and sequential compression devices        The following portions of the patients history were reviewed and updated as appropriate: current medications, allergies, past medical history, past surgical history, past family history, past social history and problem list .       Prenatal Information:   Maternal Prenatal Labs  Blood Type No results found for: ABO   Rh Status No results found for: RH   Antibody Screen No results found for: ABSCRN   Gonnorhea No results found for: GCCX   Chlamydia No results found for: CLAMYDCU   RPR No results found for: RPR   Syphilis Antibody No results found for: SYPHILIS   Rubella No results found for: RUBELLAIGGIN   Hepatitis B Surface Antigen No results found for: HEPBSAG   HIV-1 Antibody No results found for: LABHIV1   Hepatitis C Antibody No results found for: HEPCAB   Rapid Urin Drug Screen No results found for: AMPMETHU,  BARBITSCNUR, LABBENZSCN, LABMETHSCN, LABOPIASCN, THCURSCR, COCAINEUR, AMPHETSCREEN, PROPOXSCN, BUPRENORSCNU, METAMPSCNUR, OXYCODONESCN, TRICYCLICSCN   Group B Strep Culture No results found for: GBSANTIGEN           External Prenatal Results     Pregnancy Outside Results - Transcribed From Office Records - See Scanned Records For Details     Test Value Date Time    ABO  AB  07/03/22 1451    Rh  Negative  07/03/22 1451    Antibody Screen  Positive  07/03/22 1451       Negative  12/10/21 1033    Varicella IgG       Rubella  6.60 index 12/10/21 1033    Hgb  10.2 g/dL 07/04/22 0901       11.3 g/dL 07/03/22 1451       10.9 g/dL 05/24/22 1827       12.3 g/dL 12/10/21 1033    Hct  31.9 % 07/04/22 0901       35.7 % 07/03/22 1451       31.8 % 05/24/22 1827       36.9 % 12/10/21 1033    Glucose Fasting GTT       Glucose Tolerance Test 1 hour       Glucose Tolerance Test 3 hour       Gonorrhea (discrete)       Chlamydia (discrete)       RPR  Non-Reactive  12/10/21 1033    VDRL       Syphilis Antibody       HBsAg  Non-Reactive  12/10/21 1033    Herpes Simplex Virus PCR       Herpes Simplex VIrus Culture       HIV  Non-Reactive  12/10/21 1033    Hep C RNA Quant PCR       Hep C Antibody  Non-Reactive  12/10/21 1033    AFP       Group B Strep       GBS Susceptibility to Clindamycin       GBS Susceptibility to Erythromycin       Fetal Fibronectin       Genetic Testing, Maternal Blood             Drug Screening     Test Value Date Time    Urine Drug Screen       Amphetamine Screen  Negative  12/10/21 1033    Barbiturate Screen  Negative  12/10/21 1033    Benzodiazepine Screen  Negative  12/10/21 1033    Methadone Screen  Negative  12/10/21 1033    Phencyclidine Screen  Negative  12/10/21 1033    Opiates Screen  Negative  12/10/21 1033    THC Screen  Negative  12/10/21 1033    Cocaine Screen       Propoxyphene Screen  Negative  12/10/21 1033    Buprenorphine Screen  Negative  12/10/21 1033    Methamphetamine Screen       Oxycodone  Screen  Negative  12/10/21 1033    Tricyclic Antidepressants Screen  Negative  12/10/21 1033          Legend    ^: Historical                          Past OB History:       OB History    Para Term  AB Living   2 2 2 0 0 2   SAB IAB Ectopic Molar Multiple Live Births   0 0 0 0 0 2      # Outcome Date GA Lbr Binu/2nd Weight Sex Delivery Anes PTL Lv   2 Term 24 39w0d  3836 g (8 lb 7.3 oz) M CS-LTranv Spinal N JES      Name: BLOCK,MEGANSBOY      Apgar1: 7  Apgar5: 8   1 Term 21 39w2d  3700 g (8 lb 2.5 oz) F CS-LTranv EPI, Spinal N JES      Name: BLOCK,MEGANSGIRL      Apgar1: 8  Apgar5: 9       Past Medical History: Past Medical History:   Diagnosis Date   • Gestational hypertension    • Migraine    • Osteoarthritis     right foot   • Urinary tract infection     frequent as a child      Past Surgical History Past Surgical History:   Procedure Laterality Date   •  SECTION N/A 2019    Procedure:  SECTION PRIMARY;  Surgeon: Kimberly Barton MD;  Location:  [email or web address];  Service: Obstetrics/Gynecology   •  SECTION WITH TUBAL Bilateral 2022    Procedure:  SECTION REPEAT WITH TUBAL;  Surgeon: Camacho Hinojosa DO;  Location:  [email or web address];  Service: Obstetrics/Gynecology;  Laterality: Bilateral;   • TONSILLECTOMY     • WISDOM TOOTH EXTRACTION        Family History: Family History   Problem Relation Age of Onset   • No Known Problems Father    • No Known Problems Mother    • No Known Problems Brother    • No Known Problems Sister    • No Known Problems Son    • No Known Problems Daughter    • No Known Problems Paternal Grandfather    • No Known Problems Paternal Grandmother    • No Known Problems Maternal Grandmother    • No Known Problems Maternal Grandfather    • No Known Problems Maternal Aunt    • No Known Problems Maternal Uncle    • No Known Problems Paternal Aunt    • No Known Problems Paternal Uncle    • No Known Problems Other        Social History:  reports that she has never smoked. She has never used smokeless tobacco.   reports no history of alcohol use.   reports no history of drug use.                   General ROS Negative Findings:    Review of Systems   Constitutional: Negative for chills, decreased appetite and fever.   Eyes: Negative for blurred vision.   Cardiovascular: Negative for chest pain, dyspnea on exertion and irregular heartbeat.   Gastrointestinal: Negative for nausea and vomiting.     Pain in left calf   All other systems have been reviewed and are neg  Objective       Vital Signs Range for the last 24 hours  Temperature: Temp:  [98.2 °F (36.8 °C)] 98.2 °F (36.8 °C)   Temp Source:     BP: BP: (118-138)/(56-77) 125/59   Pulse: Heart Rate:  [60-72] 61   Respirations: Resp:  [18] 18   SPO2: SpO2:  [98 %] 98 %   O2 Amount (l/min):     O2 Devices     Weight: Weight:  [123 kg (271 lb)] 123 kg (271 lb)     Physical Examination:   General:   alert, appears stated age and cooperative   Skin:   normal   HEENT:     Lungs:   clear to auscultation bilaterally   Heart:   regular rate and rhythm, S1, S2 normal, no murmur, click, rub or gallop   Gastrointestinal:  Abdomen soft, uterus firm probably tender, incision intact without erythema or infection.   Lower Extremities:  Trace edema, mild calf tenderness on the left negative Homans' sign pulses equal bilaterally.  No erythema or significant discrepancy in size of lower extremity   : Exam deferred.   Musculoskeletal:     Neuro:             Laboratory Results:   Lab Results (last 24 hours)     Procedure Component Value Units Date/Time    Preeclampsia Panel [[id number]]  (Abnormal) Collected: 07/06/22 1653    Specimen: Blood Updated: 07/06/22 1719     Alkaline Phosphatase 121 U/L      ALT (SGPT) 11 U/L      AST (SGOT) 21 U/L      Creatinine 0.63 mg/dL      Total Bilirubin 0.2 mg/dL       U/L      Uric Acid 4.3 mg/dL     CBC (No Diff) [[id number]]  (Abnormal) Collected: 07/06/22  1653    Specimen: Blood Updated: 22 1700     WBC 8.78 10*3/mm3      RBC 3.93 10*6/mm3      Hemoglobin 10.0 g/dL      Hematocrit 31.2 %      MCV 79.4 fL      MCH 25.4 pg      MCHC 32.1 g/dL      RDW 14.2 %      RDW-SD 40.7 fl      MPV 11.0 fL      Platelets 232 10*3/mm3         Radiology Review:   Imaging Results (Last 24 Hours)     ** No results found for the last 24 hours. **        Other Studies:    Assessment & Plan       Postpartum hypertension        Assessment:  1. POD# 3 after repeat  with bilateral total salpingectomy  2.  No evidence of postpartum preeclampsia  3.  No evidence of left lower extremity DVT  4.  Normal post op exam    Plan:  1.  Discharged home, preeclampsia structures given, instructed patient proper nutrition, hydration, activity.  Instructed patient to continue take blood pressure at home as needed and notify OB provider if above parameters.  2. Plan of care has been reviewed with patient.  3.  Risks, benefits of treatment plan have been discussed.  4.  All questions have been answered.  5      Camacho Hinojosa,   2022  18:14 EDT

## 2023-03-07 ENCOUNTER — E-VISIT (OUTPATIENT)
Dept: FAMILY MEDICINE CLINIC | Facility: TELEHEALTH | Age: 33
End: 2023-03-07
Payer: COMMERCIAL

## 2023-03-07 PROCEDURE — BRIGHTMDVISIT: Performed by: NURSE PRACTITIONER

## 2023-03-07 NOTE — E-VISIT TREATED
Chief Complaint: Eye pain, eye bump, or irritation   Patient introduction   Patient is 32-year-old female with redness, eyeball pain, drainage, itchiness, a burning/cande/gritty feeling, eyelid swelling, eyelid pain, eyelid crusting, and eyelid redness in first one eye and now both eyes for 1 to 3 days.   Patient-submitted comments   Patient writes: *My son was diagnosed with double pink eye on 3/4/23 at [facility] in [facility].   My right eye is the main concern, but my started oozing this morning. It has no swelling yet. *.   Patient requests a 1-day excuse note.   General presentation   Eye redness described as small blood vessels spread throughout the white part of the eye.   Eye drainage is yellow and stringy. When patient wakes up in the morning, eyes are stuck shut.   Redness, swelling, or warmth of skin around the eyes. Eyeball pain is moderate. Eyelid pain is mild.   Associated symptoms include:    Upper respiratory symptoms including stuffy nose and sore throat.   None of the following symptoms: - Fever    Changes in vision    Foreign body sensation    Light sensitivity    Headache    Flaking or dry skin on eyelids   Patient does not wear contact lenses.   Known conjunctivitis exposure in the last 2 weeks. No history of conjunctivitis in the past.   Symptoms did not start shortly after a change in environment. No known history of seasonal and/or pet allergies. No history of developing similar eye symptoms at the same time of year.   No history of asthma, allergic rhinitis, or eczema.   No history of blepharitis or seborrheic dermatitis.   Regarding use of non-prescription eye drops or eye wash for current symptoms, patient writes: Similason pink eye relief.   Review of red flags/alarm symptoms:    No severe eyeball pain    No severe eyelid pain    No serious vision changes    Not feeling as if something is in the eye or sensitivity to bright lights    No recent eye injury and affected eye is  bulging out    No signs/symptoms suggesting angle-closure glaucoma (cloudiness or dullness of the iris along with moderate to severe headache)    No history of glaucoma in the past 3 months    No eye surgery in the past 3 months    No severe headache    No open sores or blisters on eyelids, nose, scalp, forehead, or around their eyes    No white or grayish sore on the eye(s)    No moderate to severe eyelid swelling    Did not get irritants in their eyes within the past week    No treatment for any eye conditions in past 4 weeks    No exposure to an STI during the 12 hours prior to symptom onset    No eye redness that looks like small blood vessels, with the redness worse around the iris   Pregnancy/menstrual status/breastfeeding:   Not pregnant. Not breastfeeding. Regarding last menstrual period, patient writes: *23   My tubes were removed during my last  on 7/3/22. *.   Self-exam:    No pain with eye movements   Current medications   Not taking other medications or supplements.   Medication allergies   No allergies to relevant medications.   Medication contraindication review   None.   Past medical history   No immunocompromising conditions. No history of cancer.   Assessment   Bacterial conjunctivitis   This is the likely diagnosis based on supporting interview responses, including:    Symptoms that started in one eye but are now bilateral    Known conjunctivitis exposure within the last 2 weeks   Plan   Medications:    tobramycin 0.3 % eye drops RX 0.3% 2 gtt in affected eye q4h 7d for infection. This medication is an antibiotic. Take it exactly as directed. You must complete the 7-day course of medication, even if you feel better after the first few days of treatment. Amount is 5 mL.   The patient's prescription will be sent to:   [facility] #[id number] [address] [id number]   Phone: [phone]     Fax: [phone]   Other:   Patient was given an excuse note  for 1 day.   Education:    Condition and causes    Prevention    Treatment and self-care    When to call provider   Additional Notes   pink eye   ----------   Electronically signed by SEEMA Abad on 2023-03-07 at 08:51AM   ----------   Patient Interview Transcript:   Which of these symptoms are bothering you? Select all that apply.    Redness in the whites of the eye(s)    Eye drainage (such as excess tears, mucus, or pus)    Eyelid swelling    Eyelid redness    Crusting of the eyelids or eyelashes   Not selected:    Eye dryness    Bump or pimple on or inside the eyelid    None of the above   Do you have any of these eye symptoms? Select all that apply.    Itchiness    Burning, cande, or gritty feeling    Pain inside the eyeball    Eyelid pain   Not selected:    Flaking or dry skin on the eyelid    None of the above   How long have you had your eye symptoms? Select one.    1 to 3 days   Not selected:    Less than 1 day    4 to 7 days    More than 7 days   Which eye is affected? Select one.    It started in one eye, but now both eyes are affected   Not selected:    My left eye    My right eye    Both eyes   Does it feel like there's something in your eye that's making it hard to open your eye or keep it open? Select one.    No   Not selected:    Yes   How would you describe your eyeball pain? Select one.    Moderate; it gets in the way of my daily activities   Not selected:    Mild    Severe; I can't open my eye(s)   How would you describe your eyelid pain? Select one.    Mild   Not selected:    Moderate; it gets in the way of my daily activities    Severe; I can't open my eye(s)   Is the skin around your eye red, swollen, or warm to the touch? Pay special attention to the area above your eyelid, the upper cheek, forehead, and the top part of the nose (between the eyes). Select one.    Yes   Not selected:    No   You mentioned that your eyelid is swollen. How would you describe it? Select one.    A little puffy,  "but I can easily open my eye   Not selected:    Swollen, and it's hard to open my eye    My eye is completely swollen shut   How would you describe your eye redness? Select one.    A. It looks like small blood vessels spread throughout the white part of the eye   Not selected:    B. It looks like small blood vessels, but the redness is much worse around the colored part of the eye    C. There's a spot of red in just one part of the white area    D. None of the above   What color is your eye drainage or crust? Select one.    Yellow   Not selected:    Clear    White    Green   How would you describe your eye drainage? Select one.    Stringy   Not selected:    Thin (watery, like normal tears)    Thick    None of the above   How would you describe your eyes when you wake up in the morning? Select one.    My eyes are \"stuck shut\"   Not selected:    My eyelashes and eyelids are crusty and gooey, but they're not stuck shut    None of the above   Is there pain or increased pain when you move your eye(s)? To test this, focus on an object in the distance with both eyes. Now look to the left, the right, above, and below that object without moving your head. Select one.    No   Not selected:    Yes   In the past 3 months, have you been diagnosed with glaucoma? Select one.    No   Not selected:    Yes   Does your eye look cloudy, causing the colored part of the eye to appear dull?    No   Not selected:    Yes   Do you have any open sores or blisters around your eyes or on your eyelids, nose, scalp, or forehead? Select one.    No   Not selected:    Yes   Do you have a white or grayish sore on your eye?    No   Not selected:    Yes   Eye conditions may be more serious when certain factors are present. Have you had any vision changes? Select one.    No   Not selected:    Yes, my vision is a little blurry, but it clears when I wipe my eyes    Yes, I'm seeing double    Yes, I can't see anything at all   Are you sensitive to bright " lights? For example, do you need to shade your eyes by wearing a hat or sunglasses? Do you find yourself holding up your hand to block out light? Do you keep your head down and turned away from lights, lamps, or windows? Select one.    No   Not selected:    Yes   Ready to send photos? If you choose not to send photos, you may need to speak with a provider to get care. Select one.    OK, I'll send photos   Not selected:    No, I'd rather not send photos   Send up to three photos for review. - Take the photos in a room with good lighting. Don't use a flash. - Make sure the photos are in focus. Photo quality is important, so keep snapping until you're happy with the images. - Take one close-up photo showing both eyes together.    Upload 1    Upload 2   Not selected:    Upload 3   Do your symptoms include a headache? Select one.    No   Not selected:    Yes, a mild headache    Yes, a moderate headache; it gets in the way of my daily activities    Yes, a severe headache; it stops me from doing my daily activities    Yes, the worst headache of my life   Along with your eye symptoms, have you had a fever or felt hot? Select one.    No   Not selected:    Yes   Do you currently have any of these symptoms? Select all that apply.    Stuffy nose    Sore throat   Not selected:    Cough    Runny nose    None of the above   In addition to your eye symptoms, have you developed any of these? Select all that apply.    None of the above   Not selected:    Muscle aches    Skin rash   Have you recently injured your eye(s)? Injuries include being scratched, hit, or poked in the eye. Select one.    No   Not selected:    Yes   In the past week, have you gotten any irritants in your eye(s)? Irritants include things such as chemicals and cleaning supplies. Select one.    No   Not selected:    Yes   Have you been around someone with pink eye (conjunctivitis) in the last 2 weeks? Select one.    Yes   Not selected:    No, not that I know of    Do you have any seasonal or pet allergies? Select one.    No, not that I know of   Not selected:    Yes   Do you often develop similar symptoms during the same time or season of the year? Select one.    No   Not selected:    Yes   Did your symptoms begin shortly after a change in your environment? This might include moving to a new home, being exposed to a new pet, or traveling to a new place. Select one.    No   Not selected:    Yes   Some eye symptoms can be caused by a sexually transmitted infection (STI). Is it possible you were exposed to an STI during the 12 hours before your symptoms began? Select one.    No, not that I know of   Not selected:    Yes   Have you ever had pink eye in the past? Select one.    No   Not selected:    Yes   Have you had eye surgery in the past 3 months? Select one.    No   Not selected:    Yes   Have you been seen or treated for any eye conditions in the past 4 weeks? Select one.    No   Not selected:    Yes   Do you wear contact lenses? Select one.    No   Not selected:    Yes   Do you have a history of any of these? Select all that apply.    None of the above   Not selected:    Asthma    Hay fever (allergic rhinitis)    Eczema   Do you have (or have you had in the past) any of these conditions? Select all that apply.    None of the above   Not selected:    Blepharitis (eyelid inflammation)    Seborrheic dermatitis (dandruff)   Have you recently been treated for any of these? Select all that apply.    None of the above   Not selected:    Aspirin- or NSAID-induced asthma or urticaria (hives)    Aspirin triad, Samter's triad, or aspirin-exacerbated respiratory disease (AERD)    Coronary artery bypass graft (CABG) surgery    Fungal eye infection    Scratched cornea, or corneal abrasion    Tuberculosis infection of the eye    Viral eye infection   Do you have any of these conditions that can affect the immune system? Scroll to see all options. Select all that apply.    None of these    Not selected:    History of bone marrow transplant    Chronic kidney disease    Chronic liver disease (including cirrhosis)    HIV/AIDS    Inflammatory bowel disease (Crohn's disease or ulcerative colitis)    Lupus    Moderate to severe plaque psoriasis    Multiple sclerosis    Rheumatoid arthritis    Sickle cell anemia    Alpha or beta thalassemia    History of solid organ transplant (kidney, liver, or heart)    History of spleen removal    An autoimmune disorder not listed here    A condition requiring treatment with long-term use of oral steroids (such as prednisone, prednisolone, or dexamethasone)   Have you ever been diagnosed with cancer? Select one.    No   Not selected:    Yes, I have cancer now    Yes, but I'm in remission   Are you pregnant? Select one.    No   Not selected:    Yes   When was your last menstrual period? If you don't currently have periods or no longer have periods, please briefly explain.    __23   My tubes were removed during my last  on 7/3/22. __   Are you breastfeeding? Select one.    No   Not selected:    Yes   Have you used any of these non-prescription eye drops or an eye wash for your current symptoms? Select all that apply.    Other (specify): Similason pink eye relief   Not selected:    Eye drops for itchy eyes (such as Naphcon A, Opcon-A, or Visine-A)    Eye drops for red eyes (such as Advanced Eye Relief Redness, Clear Eyes Redness Relief)    Eye drops for dry eyes (such as Artificial Tears, Advanced Eye Relief Dry Eyes, Refresh Tears, or Systane Ultra)    Eye drops for allergies (such as Zaditor)    Eye wash    No   Are you taking any other medications, vitamins, or supplements? Select one.    No   Not selected:    Yes   Have you ever had an allergic or bad reaction to any medication? Select one.    Yes   Not selected:    No   Have you ever had an allergic or bad reaction to any of these eye drops? Scroll to see all options. Select all that apply.    None of the  "above   Not selected:    Alcaftadine (Lastacaft)    Azelastine (Optivar)    Bepotastine (Bepreve)    Carboxymethylcellulose (Artificial Tears, Advanced Eye Relief Dry Eyes, Refresh Tears, Systane Ultra)    Hypromellose (Systane Gel, GenTeal Tears Severe Gel)    Ketotifen (Alway, Zaditor)    Mineral oil/white petrolatum (Refresh PM, Systane Nighttime, GenTeal Tears Severe Night-time ointment)    Naphazoline-pheniramine (Naphcon-A, Opcon-A, Visine Allergy Eye Relief Multi-Action)    Olopatadine (Pataday, Patanol, Pazeo)    Polyethylene glycol/propylene glycol (Systane Gel Drops)   Have you ever had an allergic or bad reaction to any of these antibiotics? Select all that apply.    None of the above   Not selected:    Bacitracin    Neomycin    Polymyxin B    Trimethoprim   Have you had an allergic or bad reaction to any of these antibiotic medications? Select all that apply.    None of the above   Not selected:    Ciprofloxacin or any \"-floxacin\" antibiotic, such as gemifloxacin, levofloxacin, moxifloxacin, or ofloxacin (Brands include Factive, Cipro, Floxin, and Levaquin)    Erythromycin or any \"-thromycin\" antibiotic, such as azithromycin or clarithromycin (Brands include Biaxin, Erythrocin, Z-constantine, and Zithromax)    Gentamicin, streptomycin, or tobramycin (Brands include Garamycin, Gentak, Genoptic, Martin-Fradin, and Tobrex)    Doxycycline or any \"-cycline\" antibiotic, such as tetracycline or minocycline   Have you ever had an allergic or bad reaction to either of these non-prescription medications? Select all that apply.    None of above   Not selected:    Acetaminophen (Tylenol)    Ibuprofen or other NSAID medications (Advil, Motrin, Aleve, Naproxen)   Do you need a doctor's note? A doctor's note confirms that you received care today and states when you can return to school or work. It does not contain information about your diagnosis or treatment plan. Your provider will make the final decision on whether to give you " a doctor's note. Doctor's notes CANNOT be backdated. Select one.    Today only (1 day)   Not selected:    Today and tomorrow (2 days)    3 days    No   Is there anything else you'd like to tell us about your symptoms?    __My son was diagnosed with double pink eye on 3/4/23 at [facility] in [facility].   My right eye is the main concern, but my started oozing this morning. It has no swelling yet. __   ----------   Medical history   Medical history data does not currently exist for this patient.

## 2023-03-07 NOTE — EXTERNAL PATIENT INSTRUCTIONS
View Doctor's Note     Diagnosis   Bacterial conjunctivitis   My name is Brook Burk. I'm a healthcare provider at [facility].   Based on your photos and interview responses, I see you have some common signs of bacterial conjunctivitis, including:    Eye redness    Eye discharge    Eyelid or eyelash crusting    Itchiness    A burning, cande, or gritty feeling in your eyes   I've given you a doctor's note for 1 day. You may return to school or work after using antibiotics for at least 24 hours.   Medications   Your pharmacy   [id number] [address] [facility] [id number] [phone]     Prescription   Tobramycin ophthalmic solution (0.3%): Apply 2 drops in affected eye every 4 hours for 7 days for infection. This medication is an antibiotic. Take it exactly as directed. You must complete the 7-day course of medication, even if you feel better after the first few days of treatment.    After reviewing your photos and responses, I'm confident the medication prescribed above will help with your current symptoms. As long as you use the medication as directed, you should start to feel better within 1 to 2 days. If your symptoms continue or get worse, schedule an appointment.   You mentioned using non-prescription eye drops for your symptoms. If you're using more than one type of eye drop, space them 3 to 5 minutes apart. To help the drops absorb, close your eyes for a few seconds after applying each type.   About your diagnosis   Conjunctivitis, also known as pink eye, is an inflammation of the clear membrane covering the eyeball and the inner eyelid. Bacterial conjunctivitis is an infection caused by some of the same bacteria that cause other common conditions, such as ear and sinus infections. These are the key things to know about bacterial conjunctivitis:    Bacterial conjunctivitis is highly contagious! Your recent exposure to pink eye likely explains your current symptoms.  "Without proper hand washing and other preventive measures, it spreads quickly to others.    This condition often starts in one eye and can spread to the other eye within a few days.    The eye discharge tends to be thick and goopy. In fact, it's common to wake up in the morning and feel like your eyes are crusty, gooey, or even \"stuck shut.\"   What to expect   Fortunately, bacterial conjunctivitis is usually mild and easy to treat. With proper treatment, you should start feeling better within 1 to 2 days. It usually clears completely in 5 days.   When to seek care   Call us at [phone]   with any sudden or unexpected symptoms.    Difficulty opening your eyes.    Moderate to severe eye pain, at rest or with eye movements.    Increase in swelling of the eyes.    Vision changes that don't get better when you wipe your eyes.    Sensitivity to bright lights, such as needing to shade your eyes by wearing a hat or sunglasses, holding up your hand to block out light, or keeping your head down and turned away from lights, lamps, or windows.    Redness in or around your eyes that increases or gets worse. In rare cases, eye drops can make eye irritation worse.    A fever above 100.4F or that lasts for more than 4 days.   Other treatment    Use refrigerated artificial tears and cool compresses to help with eye dryness, itching, and inflammation.    Gently clean around the edges of your eyes with cotton balls or gauze and warm water. This helps remove discharge and crusting.   It's possible to get conjunctivitis again after the initial symptoms have gone away. To avoid re-infection:    Throw away eye or face makeup, gauze, or cotton balls you used while your eyes were infected.    Clean any eyeglasses and cases that were used while infected.    Wash all towels, pillowcases, and linens separately and in hot water.   Prevention    Regularly wash your hands using soap and warm water. An alcohol-based handrub also works.   "  Wash your hands if you come into contact with an infected person. Wash your hands if you touch items an infected person has used.    Keep hands and fingers out of your eyes.    Don't share items such as pillows, towels, eye drops, makeup, or eyeglasses.    If you begin to wear contact lenses, follow your provider's directions for cleaning, storing, and replacing them.    Consider waiting at least 24 hours after starting treatment before returning to group activities.   Your provider   Your diagnosis was provided by Brook Burk, a member of your trusted care team at [facility].   If you have any questions, call us at [phone]  .   View Doctor's Note     Expires on 04/06/23

## (undated) DEVICE — SUT GUT CHRM 2/0 CT1 27IN 811H

## (undated) DEVICE — SOL IRR H2O BTL 1000ML STRL

## (undated) DEVICE — SUT GUT CHRM 1 CTX 36IN 905H

## (undated) DEVICE — PK C/SECT 10

## (undated) DEVICE — COATED VICRYL  (POLYGLACTIN 910) SUTURE, VIOLET BRAIDED, STERILE, SYNTHETIC ABSORBABLE SUTURE: Brand: COATED VICRYL

## (undated) DEVICE — GLV SURG SENSICARE W/ALOE PF LF 6.5 STRL

## (undated) DEVICE — SUT GUT PLN 0 STD TIE 54IN S104H

## (undated) DEVICE — SOL IRR NACL 0.9PCT BT 1000ML

## (undated) DEVICE — MAT PREVALON MOBL TRANSFR AIR WO/PAD 39X80IN

## (undated) DEVICE — SKIN AFFIX SURG ADHESIVE 72/CS 0.55ML: Brand: MEDLINE

## (undated) DEVICE — SUT PLAIN  3/0 CT1 27IN 842H

## (undated) DEVICE — TRY SPINE BLCK WHITACRE 25G 3X5IN

## (undated) DEVICE — SUT PDS 0 CT1 36IN Z346H

## (undated) DEVICE — SUT VIC 2/0 CT1 27IN J339H BX/36

## (undated) DEVICE — SUT MNCRYL 3/0 27L Y523H BX/36

## (undated) DEVICE — 39" SINGLE PATIENT USE HOVERMATT: Brand: SINGLE PATIENT USE HOVERMATT

## (undated) DEVICE — GLV SURG BIOGEL LTX PF 7 1/2